# Patient Record
Sex: MALE | Race: WHITE | Employment: OTHER | URBAN - METROPOLITAN AREA
[De-identification: names, ages, dates, MRNs, and addresses within clinical notes are randomized per-mention and may not be internally consistent; named-entity substitution may affect disease eponyms.]

---

## 2017-05-03 PROBLEM — F41.9 ANXIETY: Status: ACTIVE | Noted: 2017-05-03

## 2017-11-21 PROBLEM — R60.0 LOCALIZED EDEMA: Status: ACTIVE | Noted: 2017-01-01

## 2018-01-01 ENCOUNTER — APPOINTMENT (OUTPATIENT)
Dept: GENERAL RADIOLOGY | Age: 68
DRG: 291 | End: 2018-01-01
Payer: MEDICARE

## 2018-01-01 ENCOUNTER — HOSPITAL ENCOUNTER (INPATIENT)
Age: 68
LOS: 3 days | Discharge: NURSING, REHAB OR LONG-TERM CARE | DRG: 291 | End: 2018-08-07
Attending: EMERGENCY MEDICINE | Admitting: FAMILY MEDICINE
Payer: MEDICARE

## 2018-01-01 ENCOUNTER — HOSPITAL ENCOUNTER (EMERGENCY)
Age: 68
Discharge: HOME | End: 2018-05-25
Attending: EMERGENCY MEDICINE
Payer: MEDICARE

## 2018-01-01 ENCOUNTER — APPOINTMENT (OUTPATIENT)
Dept: GENERAL RADIOLOGY | Age: 68
End: 2018-01-01
Payer: MEDICARE

## 2018-01-01 ENCOUNTER — HOSPITAL ENCOUNTER (INPATIENT)
Age: 68
LOS: 4 days | Discharge: NURSING, REHAB OR LONG-TERM CARE | DRG: 291 | End: 2018-07-27
Attending: EMERGENCY MEDICINE | Admitting: FAMILY MEDICINE
Payer: MEDICARE

## 2018-01-01 ENCOUNTER — APPOINTMENT (OUTPATIENT)
Dept: ULTRASOUND IMAGING | Age: 68
DRG: 291 | End: 2018-01-01
Payer: MEDICARE

## 2018-01-01 ENCOUNTER — APPOINTMENT (OUTPATIENT)
Dept: NUCLEAR MEDICINE | Age: 68
DRG: 291 | End: 2018-01-01
Payer: MEDICARE

## 2018-01-01 VITALS
SYSTOLIC BLOOD PRESSURE: 116 MMHG | OXYGEN SATURATION: 98 % | DIASTOLIC BLOOD PRESSURE: 53 MMHG | WEIGHT: 187.17 LBS | HEIGHT: 69 IN | TEMPERATURE: 97.5 F | HEART RATE: 64 BPM | BODY MASS INDEX: 27.72 KG/M2 | RESPIRATION RATE: 16 BRPM

## 2018-01-01 VITALS
DIASTOLIC BLOOD PRESSURE: 63 MMHG | TEMPERATURE: 97.4 F | RESPIRATION RATE: 18 BRPM | BODY MASS INDEX: 25.84 KG/M2 | OXYGEN SATURATION: 100 % | WEIGHT: 175 LBS | HEART RATE: 66 BPM | SYSTOLIC BLOOD PRESSURE: 121 MMHG

## 2018-01-01 VITALS
HEART RATE: 65 BPM | OXYGEN SATURATION: 100 % | WEIGHT: 174 LBS | HEIGHT: 69 IN | SYSTOLIC BLOOD PRESSURE: 140 MMHG | TEMPERATURE: 97.2 F | RESPIRATION RATE: 12 BRPM | DIASTOLIC BLOOD PRESSURE: 61 MMHG | BODY MASS INDEX: 25.77 KG/M2

## 2018-01-01 DIAGNOSIS — R09.89 PULMONARY VASCULAR CONGESTION: ICD-10-CM

## 2018-01-01 DIAGNOSIS — I50.9 ACUTE ON CHRONIC CONGESTIVE HEART FAILURE, UNSPECIFIED CONGESTIVE HEART FAILURE TYPE: Primary | ICD-10-CM

## 2018-01-01 DIAGNOSIS — J90 BILATERAL PLEURAL EFFUSION: ICD-10-CM

## 2018-01-01 DIAGNOSIS — R79.9 ELEVATED BUN: ICD-10-CM

## 2018-01-01 DIAGNOSIS — E87.5 HYPERKALEMIA: ICD-10-CM

## 2018-01-01 DIAGNOSIS — E87.0 HYPERNATREMIA: ICD-10-CM

## 2018-01-01 DIAGNOSIS — I50.9 ACUTE CONGESTIVE HEART FAILURE, UNSPECIFIED CONGESTIVE HEART FAILURE TYPE: Primary | ICD-10-CM

## 2018-01-01 DIAGNOSIS — R79.89 ELEVATED SERUM CREATININE: ICD-10-CM

## 2018-01-01 DIAGNOSIS — D64.9 CHRONIC ANEMIA: ICD-10-CM

## 2018-01-01 DIAGNOSIS — R07.9 CHEST PAIN, UNSPECIFIED TYPE: Primary | ICD-10-CM

## 2018-01-01 LAB
-: ABNORMAL
ABSOLUTE EOS #: 0 K/UL (ref 0–0.4)
ABSOLUTE EOS #: 0.04 K/UL (ref 0–0.44)
ABSOLUTE EOS #: 0.1 K/UL (ref 0–0.4)
ABSOLUTE EOS #: 0.2 K/UL (ref 0–0.4)
ABSOLUTE IMMATURE GRANULOCYTE: 0.03 K/UL (ref 0–0.3)
ABSOLUTE IMMATURE GRANULOCYTE: ABNORMAL K/UL (ref 0–0.3)
ABSOLUTE LYMPH #: 0.7 K/UL (ref 1–4.8)
ABSOLUTE LYMPH #: 0.8 K/UL (ref 1–4.8)
ABSOLUTE LYMPH #: 0.8 K/UL (ref 1–4.8)
ABSOLUTE LYMPH #: 0.82 K/UL (ref 1–4.8)
ABSOLUTE LYMPH #: 0.9 K/UL (ref 1–4.8)
ABSOLUTE LYMPH #: 1.1 K/UL (ref 1.1–3.7)
ABSOLUTE LYMPH #: 1.2 K/UL (ref 1–4.8)
ABSOLUTE LYMPH #: 1.2 K/UL (ref 1–4.8)
ABSOLUTE LYMPH #: 1.3 K/UL (ref 1–4.8)
ABSOLUTE LYMPH #: 1.5 K/UL (ref 1–4.8)
ABSOLUTE MONO #: 0.26 K/UL (ref 0.1–1.3)
ABSOLUTE MONO #: 0.6 K/UL (ref 0.1–1.3)
ABSOLUTE MONO #: 0.67 K/UL (ref 0.1–1.2)
ABSOLUTE MONO #: 0.7 K/UL (ref 0.1–1.3)
ABSOLUTE MONO #: 0.7 K/UL (ref 0.1–1.3)
ABSOLUTE MONO #: 0.9 K/UL (ref 0.1–1.3)
ABSOLUTE MONO #: 0.9 K/UL (ref 0.1–1.3)
ABSOLUTE MONO #: 1.1 K/UL (ref 0.1–1.3)
ALBUMIN SERPL-MCNC: 2.7 G/DL (ref 3.5–5.2)
ALBUMIN SERPL-MCNC: 3.1 G/DL (ref 3.5–5.2)
ALBUMIN SERPL-MCNC: 3.2 G/DL (ref 3.5–5.2)
ALBUMIN SERPL-MCNC: 3.3 G/DL (ref 3.5–5.2)
ALBUMIN SERPL-MCNC: 3.3 G/DL (ref 3.5–5.2)
ALBUMIN/GLOBULIN RATIO: ABNORMAL (ref 1–2.5)
ALP BLD-CCNC: 120 U/L (ref 40–129)
ALP BLD-CCNC: 126 U/L (ref 40–129)
ALP BLD-CCNC: 126 U/L (ref 40–129)
ALP BLD-CCNC: 129 U/L (ref 40–129)
ALP BLD-CCNC: 134 U/L (ref 40–129)
ALT SERPL-CCNC: 101 U/L (ref 5–41)
ALT SERPL-CCNC: 106 U/L (ref 5–41)
ALT SERPL-CCNC: 49 U/L (ref 5–41)
ALT SERPL-CCNC: 78 U/L (ref 5–41)
ALT SERPL-CCNC: 91 U/L (ref 5–41)
ALT SERPL-CCNC: 95 U/L (ref 5–41)
AMORPHOUS: ABNORMAL
ANION GAP SERPL CALCULATED.3IONS-SCNC: 11 MMOL/L (ref 9–17)
ANION GAP SERPL CALCULATED.3IONS-SCNC: 12 MMOL/L (ref 9–17)
ANION GAP SERPL CALCULATED.3IONS-SCNC: 13 MMOL/L (ref 9–17)
ANION GAP SERPL CALCULATED.3IONS-SCNC: 14 MMOL/L (ref 9–17)
ANION GAP SERPL CALCULATED.3IONS-SCNC: 14 MMOL/L (ref 9–17)
ANION GAP SERPL CALCULATED.3IONS-SCNC: 9 MMOL/L (ref 9–17)
ANTI-NUCLEAR ANTIBODY (ANA): NEGATIVE
AST SERPL-CCNC: 105 U/L
AST SERPL-CCNC: 39 U/L
AST SERPL-CCNC: 61 U/L
AST SERPL-CCNC: 81 U/L
AST SERPL-CCNC: 84 U/L
AST SERPL-CCNC: 97 U/L
BACTERIA: ABNORMAL
BASOPHILS # BLD: 0 % (ref 0–2)
BASOPHILS # BLD: 1 % (ref 0–2)
BASOPHILS ABSOLUTE: 0 K/UL (ref 0–0.2)
BASOPHILS ABSOLUTE: <0.03 K/UL (ref 0–0.2)
BILIRUB SERPL-MCNC: 0.26 MG/DL (ref 0.3–1.2)
BILIRUB SERPL-MCNC: 0.27 MG/DL (ref 0.3–1.2)
BILIRUB SERPL-MCNC: 0.29 MG/DL (ref 0.3–1.2)
BILIRUB SERPL-MCNC: 0.35 MG/DL (ref 0.3–1.2)
BILIRUB SERPL-MCNC: 0.39 MG/DL (ref 0.3–1.2)
BILIRUBIN DIRECT: 0.1 MG/DL
BILIRUBIN DIRECT: 0.11 MG/DL
BILIRUBIN DIRECT: 0.12 MG/DL
BILIRUBIN URINE: NEGATIVE
BILIRUBIN, INDIRECT: 0.16 MG/DL (ref 0–1)
BILIRUBIN, INDIRECT: 0.17 MG/DL (ref 0–1)
BILIRUBIN, INDIRECT: 0.28 MG/DL (ref 0–1)
BNP INTERPRETATION: ABNORMAL
BNP INTERPRETATION: ABNORMAL
BUN BLDV-MCNC: 38 MG/DL (ref 8–23)
BUN BLDV-MCNC: 54 MG/DL (ref 8–23)
BUN BLDV-MCNC: 55 MG/DL (ref 8–23)
BUN BLDV-MCNC: 59 MG/DL (ref 8–23)
BUN BLDV-MCNC: 59 MG/DL (ref 8–23)
BUN BLDV-MCNC: 61 MG/DL (ref 8–23)
BUN BLDV-MCNC: 61 MG/DL (ref 8–23)
BUN BLDV-MCNC: 62 MG/DL (ref 8–23)
BUN BLDV-MCNC: 63 MG/DL (ref 8–23)
BUN BLDV-MCNC: 63 MG/DL (ref 8–23)
BUN/CREAT BLD: ABNORMAL (ref 9–20)
CALCIUM SERPL-MCNC: 8.2 MG/DL (ref 8.6–10.4)
CALCIUM SERPL-MCNC: 8.3 MG/DL (ref 8.6–10.4)
CALCIUM SERPL-MCNC: 8.5 MG/DL (ref 8.6–10.4)
CALCIUM SERPL-MCNC: 8.6 MG/DL (ref 8.6–10.4)
CALCIUM SERPL-MCNC: 8.7 MG/DL (ref 8.6–10.4)
CALCIUM SERPL-MCNC: 8.8 MG/DL (ref 8.6–10.4)
CALCIUM SERPL-MCNC: 8.9 MG/DL (ref 8.6–10.4)
CALCIUM SERPL-MCNC: 9 MG/DL (ref 8.6–10.4)
CALCIUM SERPL-MCNC: 9 MG/DL (ref 8.6–10.4)
CALCIUM SERPL-MCNC: 9.1 MG/DL (ref 8.6–10.4)
CASTS UA: ABNORMAL /LPF
CERULOPLASMIN: 23 MG/DL (ref 15–30)
CHLORIDE BLD-SCNC: 101 MMOL/L (ref 98–107)
CHLORIDE BLD-SCNC: 102 MMOL/L (ref 98–107)
CHLORIDE BLD-SCNC: 104 MMOL/L (ref 98–107)
CHLORIDE BLD-SCNC: 104 MMOL/L (ref 98–107)
CHLORIDE BLD-SCNC: 107 MMOL/L (ref 98–107)
CHLORIDE BLD-SCNC: 108 MMOL/L (ref 98–107)
CHLORIDE BLD-SCNC: 108 MMOL/L (ref 98–107)
CHLORIDE BLD-SCNC: 115 MMOL/L (ref 98–107)
CHLORIDE BLD-SCNC: 117 MMOL/L (ref 98–107)
CHLORIDE BLD-SCNC: 99 MMOL/L (ref 98–107)
CO2: 20 MMOL/L (ref 20–31)
CO2: 21 MMOL/L (ref 20–31)
CO2: 22 MMOL/L (ref 20–31)
CO2: 24 MMOL/L (ref 20–31)
CO2: 24 MMOL/L (ref 20–31)
CO2: 25 MMOL/L (ref 20–31)
CO2: 26 MMOL/L (ref 20–31)
CO2: 27 MMOL/L (ref 20–31)
CO2: 27 MMOL/L (ref 20–31)
CO2: 29 MMOL/L (ref 20–31)
COLOR: YELLOW
COMMENT UA: ABNORMAL
CORTISOL COLLECTION INFO: NORMAL
CORTISOL: 17 UG/DL (ref 2.7–18.4)
CREAT SERPL-MCNC: 1.47 MG/DL (ref 0.7–1.2)
CREAT SERPL-MCNC: 1.65 MG/DL (ref 0.7–1.2)
CREAT SERPL-MCNC: 1.67 MG/DL (ref 0.7–1.2)
CREAT SERPL-MCNC: 1.75 MG/DL (ref 0.7–1.2)
CREAT SERPL-MCNC: 1.8 MG/DL (ref 0.7–1.2)
CREAT SERPL-MCNC: 1.92 MG/DL (ref 0.7–1.2)
CREAT SERPL-MCNC: 1.99 MG/DL (ref 0.7–1.2)
CREAT SERPL-MCNC: 2.03 MG/DL (ref 0.7–1.2)
CREAT SERPL-MCNC: 2.05 MG/DL (ref 0.7–1.2)
CREAT SERPL-MCNC: 2.15 MG/DL (ref 0.7–1.2)
CRYSTALS, UA: ABNORMAL /HPF
CULTURE: NO GROWTH
CULTURE: NORMAL
DIFFERENTIAL TYPE: ABNORMAL
EKG ATRIAL RATE: 61 BPM
EKG ATRIAL RATE: 63 BPM
EKG ATRIAL RATE: 64 BPM
EKG ATRIAL RATE: 64 BPM
EKG P AXIS: 28 DEGREES
EKG P AXIS: 37 DEGREES
EKG P AXIS: 53 DEGREES
EKG P AXIS: 88 DEGREES
EKG P-R INTERVAL: 226 MS
EKG P-R INTERVAL: 256 MS
EKG P-R INTERVAL: 272 MS
EKG P-R INTERVAL: 308 MS
EKG Q-T INTERVAL: 442 MS
EKG Q-T INTERVAL: 454 MS
EKG Q-T INTERVAL: 458 MS
EKG Q-T INTERVAL: 460 MS
EKG QRS DURATION: 102 MS
EKG QRS DURATION: 102 MS
EKG QRS DURATION: 116 MS
EKG QRS DURATION: 98 MS
EKG QTC CALCULATION (BAZETT): 455 MS
EKG QTC CALCULATION (BAZETT): 457 MS
EKG QTC CALCULATION (BAZETT): 468 MS
EKG QTC CALCULATION (BAZETT): 474 MS
EKG R AXIS: 37 DEGREES
EKG R AXIS: 39 DEGREES
EKG R AXIS: 46 DEGREES
EKG R AXIS: 75 DEGREES
EKG T AXIS: -29 DEGREES
EKG T AXIS: 165 DEGREES
EKG T AXIS: 39 DEGREES
EKG T AXIS: 71 DEGREES
EKG VENTRICULAR RATE: 61 BPM
EKG VENTRICULAR RATE: 63 BPM
EKG VENTRICULAR RATE: 64 BPM
EKG VENTRICULAR RATE: 64 BPM
EOSINOPHILS RELATIVE PERCENT: 0 % (ref 0–4)
EOSINOPHILS RELATIVE PERCENT: 1 % (ref 0–4)
EOSINOPHILS RELATIVE PERCENT: 1 % (ref 1–4)
EOSINOPHILS RELATIVE PERCENT: 2 % (ref 0–4)
EOSINOPHILS RELATIVE PERCENT: 2 % (ref 0–4)
EOSINOPHILS RELATIVE PERCENT: 3 % (ref 0–4)
EPITHELIAL CELLS UA: ABNORMAL /HPF
ESTIMATED AVERAGE GLUCOSE: 134 MG/DL
FERRITIN: 249 UG/L (ref 30–400)
FOLATE: 8.5 NG/ML
GFR AFRICAN AMERICAN: 37 ML/MIN
GFR AFRICAN AMERICAN: 39 ML/MIN
GFR AFRICAN AMERICAN: 40 ML/MIN
GFR AFRICAN AMERICAN: 41 ML/MIN
GFR AFRICAN AMERICAN: 42 ML/MIN
GFR AFRICAN AMERICAN: 46 ML/MIN
GFR AFRICAN AMERICAN: 47 ML/MIN
GFR AFRICAN AMERICAN: 50 ML/MIN
GFR AFRICAN AMERICAN: 51 ML/MIN
GFR AFRICAN AMERICAN: 58 ML/MIN
GFR NON-AFRICAN AMERICAN: 31 ML/MIN
GFR NON-AFRICAN AMERICAN: 32 ML/MIN
GFR NON-AFRICAN AMERICAN: 33 ML/MIN
GFR NON-AFRICAN AMERICAN: 34 ML/MIN
GFR NON-AFRICAN AMERICAN: 35 ML/MIN
GFR NON-AFRICAN AMERICAN: 38 ML/MIN
GFR NON-AFRICAN AMERICAN: 39 ML/MIN
GFR NON-AFRICAN AMERICAN: 41 ML/MIN
GFR NON-AFRICAN AMERICAN: 42 ML/MIN
GFR NON-AFRICAN AMERICAN: 48 ML/MIN
GFR SERPL CREATININE-BSD FRML MDRD: ABNORMAL ML/MIN/{1.73_M2}
GLOBULIN: ABNORMAL G/DL (ref 1.5–3.8)
GLUCOSE BLD-MCNC: 103 MG/DL (ref 75–110)
GLUCOSE BLD-MCNC: 108 MG/DL (ref 75–110)
GLUCOSE BLD-MCNC: 113 MG/DL (ref 75–110)
GLUCOSE BLD-MCNC: 118 MG/DL (ref 75–110)
GLUCOSE BLD-MCNC: 120 MG/DL (ref 75–110)
GLUCOSE BLD-MCNC: 122 MG/DL (ref 70–99)
GLUCOSE BLD-MCNC: 125 MG/DL (ref 75–110)
GLUCOSE BLD-MCNC: 131 MG/DL (ref 70–99)
GLUCOSE BLD-MCNC: 131 MG/DL (ref 75–110)
GLUCOSE BLD-MCNC: 134 MG/DL (ref 75–110)
GLUCOSE BLD-MCNC: 135 MG/DL (ref 75–110)
GLUCOSE BLD-MCNC: 136 MG/DL (ref 75–110)
GLUCOSE BLD-MCNC: 137 MG/DL (ref 70–99)
GLUCOSE BLD-MCNC: 137 MG/DL (ref 70–99)
GLUCOSE BLD-MCNC: 139 MG/DL (ref 75–110)
GLUCOSE BLD-MCNC: 143 MG/DL (ref 70–99)
GLUCOSE BLD-MCNC: 148 MG/DL (ref 70–99)
GLUCOSE BLD-MCNC: 161 MG/DL (ref 75–110)
GLUCOSE BLD-MCNC: 167 MG/DL (ref 70–99)
GLUCOSE BLD-MCNC: 178 MG/DL (ref 75–110)
GLUCOSE BLD-MCNC: 204 MG/DL (ref 70–99)
GLUCOSE BLD-MCNC: 212 MG/DL (ref 75–110)
GLUCOSE BLD-MCNC: 32 MG/DL (ref 75–110)
GLUCOSE BLD-MCNC: 45 MG/DL (ref 75–110)
GLUCOSE BLD-MCNC: 46 MG/DL (ref 75–110)
GLUCOSE BLD-MCNC: 51 MG/DL (ref 70–99)
GLUCOSE BLD-MCNC: 52 MG/DL (ref 75–110)
GLUCOSE BLD-MCNC: 62 MG/DL (ref 75–110)
GLUCOSE BLD-MCNC: 76 MG/DL (ref 75–110)
GLUCOSE BLD-MCNC: 78 MG/DL (ref 75–110)
GLUCOSE BLD-MCNC: 78 MG/DL (ref 75–110)
GLUCOSE BLD-MCNC: 79 MG/DL (ref 75–110)
GLUCOSE BLD-MCNC: 87 MG/DL (ref 75–110)
GLUCOSE BLD-MCNC: 88 MG/DL (ref 75–110)
GLUCOSE BLD-MCNC: 89 MG/DL (ref 70–99)
GLUCOSE BLD-MCNC: 90 MG/DL (ref 75–110)
GLUCOSE BLD-MCNC: 96 MG/DL (ref 75–110)
GLUCOSE URINE: NEGATIVE
HAV IGM SER IA-ACNC: NONREACTIVE
HAV IGM SER IA-ACNC: NONREACTIVE
HBA1C MFR BLD: 6.3 % (ref 4–6)
HCT VFR BLD CALC: 29.9 % (ref 41–53)
HCT VFR BLD CALC: 30.2 % (ref 41–53)
HCT VFR BLD CALC: 30.3 % (ref 41–53)
HCT VFR BLD CALC: 30.3 % (ref 41–53)
HCT VFR BLD CALC: 30.6 % (ref 41–53)
HCT VFR BLD CALC: 30.7 % (ref 41–53)
HCT VFR BLD CALC: 30.8 % (ref 41–53)
HCT VFR BLD CALC: 31 % (ref 41–53)
HCT VFR BLD CALC: 31.5 % (ref 41–53)
HCT VFR BLD CALC: 32.2 % (ref 40.7–50.3)
HCT VFR BLD CALC: 32.9 % (ref 41–53)
HEMOGLOBIN: 10 G/DL (ref 13.5–17.5)
HEMOGLOBIN: 10.1 G/DL (ref 13.5–17.5)
HEMOGLOBIN: 10.3 G/DL (ref 13.5–17.5)
HEMOGLOBIN: 10.3 G/DL (ref 13.5–17.5)
HEMOGLOBIN: 10.4 G/DL (ref 13–17)
HEMOGLOBIN: 10.5 G/DL (ref 13.5–17.5)
HEMOGLOBIN: 9.7 G/DL (ref 13.5–17.5)
HEMOGLOBIN: 9.8 G/DL (ref 13.5–17.5)
HEMOGLOBIN: 9.9 G/DL (ref 13.5–17.5)
HEPATITIS B CORE IGM ANTIBODY: NONREACTIVE
HEPATITIS B CORE IGM ANTIBODY: NONREACTIVE
HEPATITIS B SURFACE ANTIGEN: NONREACTIVE
HEPATITIS B SURFACE ANTIGEN: NONREACTIVE
HEPATITIS C ANTIBODY: NONREACTIVE
HEPATITIS C ANTIBODY: NONREACTIVE
IMMATURE GRANULOCYTES: 0 %
IMMATURE GRANULOCYTES: ABNORMAL %
INR BLD: 1.1
IRON SATURATION: 18 % (ref 20–55)
IRON: 48 UG/DL (ref 59–158)
KETONES, URINE: NEGATIVE
LEUKOCYTE ESTERASE, URINE: NEGATIVE
LV EF: 58 %
LVEF MODALITY: NORMAL
LYMPHOCYTES # BLD: 10 % (ref 24–44)
LYMPHOCYTES # BLD: 12 % (ref 24–44)
LYMPHOCYTES # BLD: 12 % (ref 24–44)
LYMPHOCYTES # BLD: 13 % (ref 24–44)
LYMPHOCYTES # BLD: 14 % (ref 24–43)
LYMPHOCYTES # BLD: 15 % (ref 24–44)
LYMPHOCYTES # BLD: 16 % (ref 24–44)
LYMPHOCYTES # BLD: 17 % (ref 24–44)
LYMPHOCYTES # BLD: 18 % (ref 24–44)
LYMPHOCYTES # BLD: 19 % (ref 24–44)
Lab: NORMAL
MAGNESIUM: 1.7 MG/DL (ref 1.6–2.6)
MAGNESIUM: 1.9 MG/DL (ref 1.6–2.6)
MAGNESIUM: 1.9 MG/DL (ref 1.6–2.6)
MCH RBC QN AUTO: 31.7 PG (ref 25.2–33.5)
MCH RBC QN AUTO: 31.9 PG (ref 26–34)
MCH RBC QN AUTO: 32 PG (ref 26–34)
MCH RBC QN AUTO: 32.2 PG (ref 26–34)
MCH RBC QN AUTO: 32.7 PG (ref 26–34)
MCH RBC QN AUTO: 32.7 PG (ref 26–34)
MCH RBC QN AUTO: 32.8 PG (ref 26–34)
MCH RBC QN AUTO: 33.2 PG (ref 26–34)
MCHC RBC AUTO-ENTMCNC: 31.8 G/DL (ref 31–37)
MCHC RBC AUTO-ENTMCNC: 32 G/DL (ref 31–37)
MCHC RBC AUTO-ENTMCNC: 32.3 G/DL (ref 28.4–34.8)
MCHC RBC AUTO-ENTMCNC: 32.7 G/DL (ref 31–37)
MCHC RBC AUTO-ENTMCNC: 32.8 G/DL (ref 31–37)
MCHC RBC AUTO-ENTMCNC: 33.1 G/DL (ref 31–37)
MCV RBC AUTO: 100.3 FL (ref 80–100)
MCV RBC AUTO: 100.8 FL (ref 80–100)
MCV RBC AUTO: 101.4 FL (ref 80–100)
MCV RBC AUTO: 97.8 FL (ref 80–100)
MCV RBC AUTO: 98 FL (ref 80–100)
MCV RBC AUTO: 98.2 FL (ref 82.6–102.9)
MCV RBC AUTO: 98.3 FL (ref 80–100)
MCV RBC AUTO: 98.6 FL (ref 80–100)
MCV RBC AUTO: 98.9 FL (ref 80–100)
MCV RBC AUTO: 99.1 FL (ref 80–100)
MITOCHONDRIAL ANTIBODY: 2 UNITS (ref 0–20)
MONOCYTES # BLD: 10 % (ref 1–7)
MONOCYTES # BLD: 10 % (ref 1–7)
MONOCYTES # BLD: 11 % (ref 1–7)
MONOCYTES # BLD: 13 % (ref 1–7)
MONOCYTES # BLD: 14 % (ref 1–7)
MONOCYTES # BLD: 5 % (ref 1–7)
MONOCYTES # BLD: 8 % (ref 1–7)
MONOCYTES # BLD: 9 % (ref 1–7)
MONOCYTES # BLD: 9 % (ref 1–7)
MONOCYTES # BLD: 9 % (ref 3–12)
MORPHOLOGY: ABNORMAL
MUCUS: ABNORMAL
NITRITE, URINE: POSITIVE
NRBC AUTOMATED: 0 PER 100 WBC
NRBC AUTOMATED: ABNORMAL PER 100 WBC
OTHER OBSERVATIONS UA: ABNORMAL
PARTIAL THROMBOPLASTIN TIME: 40.4 SEC (ref 23–31)
PARTIAL THROMBOPLASTIN TIME: 51.8 SEC (ref 23–31)
PARTIAL THROMBOPLASTIN TIME: >120 SEC (ref 23–31)
PARTIAL THROMBOPLASTIN TIME: >120 SEC (ref 23–31)
PDW BLD-RTO: 13.9 % (ref 11.8–14.4)
PDW BLD-RTO: 15.7 % (ref 11.5–14.9)
PDW BLD-RTO: 15.8 % (ref 11.5–14.9)
PDW BLD-RTO: 15.8 % (ref 11.5–14.9)
PDW BLD-RTO: 15.9 % (ref 11.5–14.9)
PDW BLD-RTO: 16.4 % (ref 11.5–14.9)
PDW BLD-RTO: 16.4 % (ref 11.5–14.9)
PDW BLD-RTO: 16.5 % (ref 11.5–14.9)
PDW BLD-RTO: 16.8 % (ref 11.5–14.9)
PDW BLD-RTO: 17.2 % (ref 11.5–14.9)
PH UA: 6 (ref 5–8)
PHOSPHORUS: 2.5 MG/DL (ref 2.5–4.5)
PHOSPHORUS: 4.2 MG/DL (ref 2.5–4.5)
PLATELET # BLD: 168 K/UL (ref 150–450)
PLATELET # BLD: 172 K/UL (ref 150–450)
PLATELET # BLD: 184 K/UL (ref 150–450)
PLATELET # BLD: 185 K/UL (ref 150–450)
PLATELET # BLD: 67 K/UL (ref 150–450)
PLATELET # BLD: 70 K/UL (ref 150–450)
PLATELET # BLD: 79 K/UL (ref 150–450)
PLATELET # BLD: 81 K/UL (ref 150–450)
PLATELET # BLD: 83 K/UL (ref 150–450)
PLATELET # BLD: ABNORMAL K/UL (ref 138–453)
PLATELET ESTIMATE: ABNORMAL
PLATELET, FLUORESCENCE: 134 K/UL (ref 138–453)
PLATELET, IMMATURE FRACTION: 4.3 % (ref 1.1–10.3)
PMV BLD AUTO: 10 FL (ref 6–12)
PMV BLD AUTO: 10.7 FL (ref 6–12)
PMV BLD AUTO: 11.5 FL (ref 6–12)
PMV BLD AUTO: 11.9 FL (ref 6–12)
PMV BLD AUTO: 12.1 FL (ref 6–12)
PMV BLD AUTO: 12.1 FL (ref 6–12)
PMV BLD AUTO: 9.3 FL (ref 6–12)
PMV BLD AUTO: 9.5 FL (ref 6–12)
PMV BLD AUTO: 9.7 FL (ref 6–12)
PMV BLD AUTO: ABNORMAL FL (ref 8.1–13.5)
POC TROPONIN I: 0.01 NG/ML (ref 0–0.1)
POC TROPONIN I: 0.01 NG/ML (ref 0–0.1)
POC TROPONIN INTERP: NORMAL
POC TROPONIN INTERP: NORMAL
POTASSIUM SERPL-SCNC: 3.9 MMOL/L (ref 3.7–5.3)
POTASSIUM SERPL-SCNC: 4.1 MMOL/L (ref 3.7–5.3)
POTASSIUM SERPL-SCNC: 4.2 MMOL/L (ref 3.7–5.3)
POTASSIUM SERPL-SCNC: 4.3 MMOL/L (ref 3.7–5.3)
POTASSIUM SERPL-SCNC: 4.3 MMOL/L (ref 3.7–5.3)
POTASSIUM SERPL-SCNC: 4.4 MMOL/L (ref 3.7–5.3)
POTASSIUM SERPL-SCNC: 4.5 MMOL/L (ref 3.7–5.3)
POTASSIUM SERPL-SCNC: 4.8 MMOL/L (ref 3.7–5.3)
POTASSIUM SERPL-SCNC: 4.9 MMOL/L (ref 3.7–5.3)
POTASSIUM SERPL-SCNC: 5.2 MMOL/L (ref 3.7–5.3)
POTASSIUM SERPL-SCNC: 5.4 MMOL/L (ref 3.7–5.3)
PRO-BNP: 2593 PG/ML
PRO-BNP: 4033 PG/ML
PROTEIN UA: ABNORMAL
PROTHROMBIN TIME: 11.4 SEC (ref 9.7–12)
RBC # BLD: 2.95 M/UL (ref 4.5–5.9)
RBC # BLD: 3.05 M/UL (ref 4.5–5.9)
RBC # BLD: 3.06 M/UL (ref 4.5–5.9)
RBC # BLD: 3.09 M/UL (ref 4.5–5.9)
RBC # BLD: 3.1 M/UL (ref 4.5–5.9)
RBC # BLD: 3.14 M/UL (ref 4.5–5.9)
RBC # BLD: 3.14 M/UL (ref 4.5–5.9)
RBC # BLD: 3.2 M/UL (ref 4.5–5.9)
RBC # BLD: 3.27 M/UL (ref 4.5–5.9)
RBC # BLD: 3.28 M/UL (ref 4.21–5.77)
RBC # BLD: ABNORMAL 10*6/UL
RBC UA: ABNORMAL /HPF
RENAL EPITHELIAL, UA: ABNORMAL /HPF
SEG NEUTROPHILS: 67 % (ref 36–66)
SEG NEUTROPHILS: 69 % (ref 36–66)
SEG NEUTROPHILS: 72 % (ref 36–66)
SEG NEUTROPHILS: 73 % (ref 36–66)
SEG NEUTROPHILS: 73 % (ref 36–66)
SEG NEUTROPHILS: 75 % (ref 36–66)
SEG NEUTROPHILS: 75 % (ref 36–66)
SEG NEUTROPHILS: 76 % (ref 36–65)
SEG NEUTROPHILS: 79 % (ref 36–66)
SEG NEUTROPHILS: 79 % (ref 36–66)
SEGMENTED NEUTROPHILS ABSOLUTE COUNT: 4.02 K/UL (ref 1.3–9.1)
SEGMENTED NEUTROPHILS ABSOLUTE COUNT: 4.6 K/UL (ref 1.3–9.1)
SEGMENTED NEUTROPHILS ABSOLUTE COUNT: 4.6 K/UL (ref 1.3–9.1)
SEGMENTED NEUTROPHILS ABSOLUTE COUNT: 4.9 K/UL (ref 1.3–9.1)
SEGMENTED NEUTROPHILS ABSOLUTE COUNT: 5 K/UL (ref 1.3–9.1)
SEGMENTED NEUTROPHILS ABSOLUTE COUNT: 5.4 K/UL (ref 1.3–9.1)
SEGMENTED NEUTROPHILS ABSOLUTE COUNT: 5.5 K/UL (ref 1.3–9.1)
SEGMENTED NEUTROPHILS ABSOLUTE COUNT: 5.84 K/UL (ref 1.5–8.1)
SEGMENTED NEUTROPHILS ABSOLUTE COUNT: 6.2 K/UL (ref 1.3–9.1)
SEGMENTED NEUTROPHILS ABSOLUTE COUNT: 6.6 K/UL (ref 1.3–9.1)
SODIUM BLD-SCNC: 132 MMOL/L (ref 135–144)
SODIUM BLD-SCNC: 137 MMOL/L (ref 135–144)
SODIUM BLD-SCNC: 139 MMOL/L (ref 135–144)
SODIUM BLD-SCNC: 141 MMOL/L (ref 135–144)
SODIUM BLD-SCNC: 142 MMOL/L (ref 135–144)
SODIUM BLD-SCNC: 144 MMOL/L (ref 135–144)
SODIUM BLD-SCNC: 147 MMOL/L (ref 135–144)
SODIUM BLD-SCNC: 148 MMOL/L (ref 135–144)
SODIUM BLD-SCNC: 149 MMOL/L (ref 135–144)
SODIUM BLD-SCNC: 150 MMOL/L (ref 135–144)
SPECIFIC GRAVITY UA: 1.01 (ref 1–1.03)
SPECIMEN DESCRIPTION: NORMAL
STATUS: NORMAL
TOTAL IRON BINDING CAPACITY: 269 UG/DL (ref 250–450)
TOTAL PROTEIN: 6.1 G/DL (ref 6.4–8.3)
TOTAL PROTEIN: 6.2 G/DL (ref 6.4–8.3)
TOTAL PROTEIN: 6.3 G/DL (ref 6.4–8.3)
TOTAL PROTEIN: 6.4 G/DL (ref 6.4–8.3)
TOTAL PROTEIN: 6.7 G/DL (ref 6.4–8.3)
TRICHOMONAS: ABNORMAL
TROPONIN INTERP: NORMAL
TROPONIN T: <0.03 NG/ML
TURBIDITY: CLEAR
UNSATURATED IRON BINDING CAPACITY: 221 UG/DL (ref 112–347)
URINE HGB: NEGATIVE
UROBILINOGEN, URINE: NORMAL
VITAMIN B-12: 1926 PG/ML (ref 232–1245)
WBC # BLD: 5.1 K/UL (ref 3.5–11)
WBC # BLD: 6.1 K/UL (ref 3.5–11)
WBC # BLD: 6.3 K/UL (ref 3.5–11)
WBC # BLD: 6.9 K/UL (ref 3.5–11)
WBC # BLD: 7.4 K/UL (ref 3.5–11)
WBC # BLD: 7.4 K/UL (ref 3.5–11)
WBC # BLD: 7.7 K/UL (ref 3.5–11.3)
WBC # BLD: 7.8 K/UL (ref 3.5–11)
WBC # BLD: 8.2 K/UL (ref 3.5–11)
WBC # BLD: 8.3 K/UL (ref 3.5–11)
WBC # BLD: ABNORMAL 10*3/UL
WBC UA: ABNORMAL /HPF
YEAST: ABNORMAL

## 2018-01-01 PROCEDURE — 87086 URINE CULTURE/COLONY COUNT: CPT

## 2018-01-01 PROCEDURE — 84484 ASSAY OF TROPONIN QUANT: CPT

## 2018-01-01 PROCEDURE — 83735 ASSAY OF MAGNESIUM: CPT

## 2018-01-01 PROCEDURE — 6370000000 HC RX 637 (ALT 250 FOR IP): Performed by: INTERNAL MEDICINE

## 2018-01-01 PROCEDURE — 2580000003 HC RX 258: Performed by: INTERNAL MEDICINE

## 2018-01-01 PROCEDURE — A9540 TC99M MAA: HCPCS | Performed by: EMERGENCY MEDICINE

## 2018-01-01 PROCEDURE — 97110 THERAPEUTIC EXERCISES: CPT

## 2018-01-01 PROCEDURE — 85025 COMPLETE CBC W/AUTO DIFF WBC: CPT

## 2018-01-01 PROCEDURE — 82728 ASSAY OF FERRITIN: CPT

## 2018-01-01 PROCEDURE — 97535 SELF CARE MNGMENT TRAINING: CPT

## 2018-01-01 PROCEDURE — 6360000002 HC RX W HCPCS: Performed by: FAMILY MEDICINE

## 2018-01-01 PROCEDURE — 81001 URINALYSIS AUTO W/SCOPE: CPT

## 2018-01-01 PROCEDURE — 99285 EMERGENCY DEPT VISIT HI MDM: CPT

## 2018-01-01 PROCEDURE — 99232 SBSQ HOSP IP/OBS MODERATE 35: CPT | Performed by: INTERNAL MEDICINE

## 2018-01-01 PROCEDURE — 2060000000 HC ICU INTERMEDIATE R&B

## 2018-01-01 PROCEDURE — 71045 X-RAY EXAM CHEST 1 VIEW: CPT

## 2018-01-01 PROCEDURE — 2500000003 HC RX 250 WO HCPCS: Performed by: FAMILY MEDICINE

## 2018-01-01 PROCEDURE — 6370000000 HC RX 637 (ALT 250 FOR IP): Performed by: FAMILY MEDICINE

## 2018-01-01 PROCEDURE — 85730 THROMBOPLASTIN TIME PARTIAL: CPT

## 2018-01-01 PROCEDURE — 83880 ASSAY OF NATRIURETIC PEPTIDE: CPT

## 2018-01-01 PROCEDURE — 76705 ECHO EXAM OF ABDOMEN: CPT

## 2018-01-01 PROCEDURE — 93005 ELECTROCARDIOGRAM TRACING: CPT

## 2018-01-01 PROCEDURE — 84460 ALANINE AMINO (ALT) (SGPT): CPT

## 2018-01-01 PROCEDURE — 82390 ASSAY OF CERULOPLASMIN: CPT

## 2018-01-01 PROCEDURE — G8978 MOBILITY CURRENT STATUS: HCPCS

## 2018-01-01 PROCEDURE — 97116 GAIT TRAINING THERAPY: CPT

## 2018-01-01 PROCEDURE — 85018 HEMOGLOBIN: CPT

## 2018-01-01 PROCEDURE — 6360000002 HC RX W HCPCS: Performed by: INTERNAL MEDICINE

## 2018-01-01 PROCEDURE — 87040 BLOOD CULTURE FOR BACTERIA: CPT

## 2018-01-01 PROCEDURE — 36415 COLL VENOUS BLD VENIPUNCTURE: CPT

## 2018-01-01 PROCEDURE — 83036 HEMOGLOBIN GLYCOSYLATED A1C: CPT

## 2018-01-01 PROCEDURE — 82607 VITAMIN B-12: CPT

## 2018-01-01 PROCEDURE — 2580000003 HC RX 258: Performed by: FAMILY MEDICINE

## 2018-01-01 PROCEDURE — 99239 HOSP IP/OBS DSCHRG MGMT >30: CPT | Performed by: FAMILY MEDICINE

## 2018-01-01 PROCEDURE — 84450 TRANSFERASE (AST) (SGOT): CPT

## 2018-01-01 PROCEDURE — 97162 PT EVAL MOD COMPLEX 30 MIN: CPT

## 2018-01-01 PROCEDURE — 94664 DEMO&/EVAL PT USE INHALER: CPT

## 2018-01-01 PROCEDURE — 80074 ACUTE HEPATITIS PANEL: CPT

## 2018-01-01 PROCEDURE — 97166 OT EVAL MOD COMPLEX 45 MIN: CPT

## 2018-01-01 PROCEDURE — 6360000002 HC RX W HCPCS: Performed by: EMERGENCY MEDICINE

## 2018-01-01 PROCEDURE — 80076 HEPATIC FUNCTION PANEL: CPT

## 2018-01-01 PROCEDURE — 85055 RETICULATED PLATELET ASSAY: CPT

## 2018-01-01 PROCEDURE — 85014 HEMATOCRIT: CPT

## 2018-01-01 PROCEDURE — 83516 IMMUNOASSAY NONANTIBODY: CPT

## 2018-01-01 PROCEDURE — 84132 ASSAY OF SERUM POTASSIUM: CPT

## 2018-01-01 PROCEDURE — 99232 SBSQ HOSP IP/OBS MODERATE 35: CPT | Performed by: FAMILY MEDICINE

## 2018-01-01 PROCEDURE — 96374 THER/PROPH/DIAG INJ IV PUSH: CPT

## 2018-01-01 PROCEDURE — 80053 COMPREHEN METABOLIC PANEL: CPT

## 2018-01-01 PROCEDURE — 80048 BASIC METABOLIC PNL TOTAL CA: CPT

## 2018-01-01 PROCEDURE — 97530 THERAPEUTIC ACTIVITIES: CPT

## 2018-01-01 PROCEDURE — 82947 ASSAY GLUCOSE BLOOD QUANT: CPT

## 2018-01-01 PROCEDURE — 97165 OT EVAL LOW COMPLEX 30 MIN: CPT

## 2018-01-01 PROCEDURE — 51798 US URINE CAPACITY MEASURE: CPT

## 2018-01-01 PROCEDURE — 83550 IRON BINDING TEST: CPT

## 2018-01-01 PROCEDURE — 78582 LUNG VENTILAT&PERFUS IMAGING: CPT

## 2018-01-01 PROCEDURE — 84295 ASSAY OF SERUM SODIUM: CPT

## 2018-01-01 PROCEDURE — 99222 1ST HOSP IP/OBS MODERATE 55: CPT | Performed by: FAMILY MEDICINE

## 2018-01-01 PROCEDURE — 85610 PROTHROMBIN TIME: CPT

## 2018-01-01 PROCEDURE — 86038 ANTINUCLEAR ANTIBODIES: CPT

## 2018-01-01 PROCEDURE — 82533 TOTAL CORTISOL: CPT

## 2018-01-01 PROCEDURE — 71046 X-RAY EXAM CHEST 2 VIEWS: CPT

## 2018-01-01 PROCEDURE — 99222 1ST HOSP IP/OBS MODERATE 55: CPT | Performed by: INTERNAL MEDICINE

## 2018-01-01 PROCEDURE — 3430000000 HC RX DIAGNOSTIC RADIOPHARMACEUTICAL: Performed by: EMERGENCY MEDICINE

## 2018-01-01 PROCEDURE — A9538 TC99M PYROPHOSPHATE: HCPCS | Performed by: EMERGENCY MEDICINE

## 2018-01-01 PROCEDURE — G8979 MOBILITY GOAL STATUS: HCPCS

## 2018-01-01 PROCEDURE — 76770 US EXAM ABDO BACK WALL COMP: CPT

## 2018-01-01 PROCEDURE — 99223 1ST HOSP IP/OBS HIGH 75: CPT | Performed by: FAMILY MEDICINE

## 2018-01-01 PROCEDURE — 6370000000 HC RX 637 (ALT 250 FOR IP): Performed by: NURSE PRACTITIONER

## 2018-01-01 PROCEDURE — 83540 ASSAY OF IRON: CPT

## 2018-01-01 PROCEDURE — 84100 ASSAY OF PHOSPHORUS: CPT

## 2018-01-01 PROCEDURE — 93306 TTE W/DOPPLER COMPLETE: CPT

## 2018-01-01 PROCEDURE — 51702 INSERT TEMP BLADDER CATH: CPT

## 2018-01-01 PROCEDURE — 82746 ASSAY OF FOLIC ACID SERUM: CPT

## 2018-01-01 PROCEDURE — 2580000003 HC RX 258: Performed by: EMERGENCY MEDICINE

## 2018-01-01 RX ORDER — SODIUM CHLORIDE 0.9 % (FLUSH) 0.9 %
10 SYRINGE (ML) INJECTION EVERY 12 HOURS SCHEDULED
Status: DISCONTINUED | OUTPATIENT
Start: 2018-01-01 | End: 2018-01-01 | Stop reason: HOSPADM

## 2018-01-01 RX ORDER — LOSARTAN POTASSIUM 25 MG/1
25 TABLET ORAL DAILY
Status: DISCONTINUED | OUTPATIENT
Start: 2018-01-01 | End: 2018-01-01

## 2018-01-01 RX ORDER — FUROSEMIDE 40 MG/1
40 TABLET ORAL DAILY
Status: DISCONTINUED | OUTPATIENT
Start: 2018-01-01 | End: 2018-01-01 | Stop reason: HOSPADM

## 2018-01-01 RX ORDER — CLOPIDOGREL BISULFATE 75 MG/1
75 TABLET ORAL DAILY
Status: DISCONTINUED | OUTPATIENT
Start: 2018-01-01 | End: 2018-01-01 | Stop reason: HOSPADM

## 2018-01-01 RX ORDER — FUROSEMIDE 10 MG/ML
40 INJECTION INTRAMUSCULAR; INTRAVENOUS 2 TIMES DAILY
Status: DISCONTINUED | OUTPATIENT
Start: 2018-01-01 | End: 2018-01-01

## 2018-01-01 RX ORDER — BACLOFEN 10 MG/1
10 TABLET ORAL 3 TIMES DAILY
Status: DISCONTINUED | OUTPATIENT
Start: 2018-01-01 | End: 2018-01-01 | Stop reason: HOSPADM

## 2018-01-01 RX ORDER — AZELASTINE HYDROCHLORIDE, FLUTICASONE PROPIONATE 137; 50 UG/1; UG/1
1 SPRAY, METERED NASAL 2 TIMES DAILY
Status: DISCONTINUED | OUTPATIENT
Start: 2018-01-01 | End: 2018-01-01 | Stop reason: RX

## 2018-01-01 RX ORDER — LATANOPROST 50 UG/ML
1 SOLUTION/ DROPS OPHTHALMIC NIGHTLY
Status: DISCONTINUED | OUTPATIENT
Start: 2018-01-01 | End: 2018-01-01

## 2018-01-01 RX ORDER — TAMSULOSIN HYDROCHLORIDE 0.4 MG/1
0.4 CAPSULE ORAL DAILY
Status: DISCONTINUED | OUTPATIENT
Start: 2018-01-01 | End: 2018-01-01 | Stop reason: HOSPADM

## 2018-01-01 RX ORDER — DEXTROSE MONOHYDRATE 50 MG/ML
100 INJECTION, SOLUTION INTRAVENOUS PRN
Status: DISCONTINUED | OUTPATIENT
Start: 2018-01-01 | End: 2018-01-01 | Stop reason: HOSPADM

## 2018-01-01 RX ORDER — DEXTROSE MONOHYDRATE 25 G/50ML
12.5 INJECTION, SOLUTION INTRAVENOUS PRN
Status: DISCONTINUED | OUTPATIENT
Start: 2018-01-01 | End: 2018-01-01 | Stop reason: HOSPADM

## 2018-01-01 RX ORDER — BISACODYL 10 MG
10 SUPPOSITORY, RECTAL RECTAL DAILY PRN
Status: DISCONTINUED | OUTPATIENT
Start: 2018-01-01 | End: 2018-01-01 | Stop reason: HOSPADM

## 2018-01-01 RX ORDER — ACETAMINOPHEN 325 MG/1
650 TABLET ORAL EVERY 4 HOURS PRN
Status: DISCONTINUED | OUTPATIENT
Start: 2018-01-01 | End: 2018-01-01 | Stop reason: HOSPADM

## 2018-01-01 RX ORDER — AZELASTINE HYDROCHLORIDE, FLUTICASONE PROPIONATE 137; 50 UG/1; UG/1
1 SPRAY, METERED NASAL 2 TIMES DAILY
Status: DISCONTINUED | OUTPATIENT
Start: 2018-01-01 | End: 2018-01-01 | Stop reason: HOSPADM

## 2018-01-01 RX ORDER — LATANOPROST 50 UG/ML
1 SOLUTION/ DROPS OPHTHALMIC NIGHTLY
Status: DISCONTINUED | OUTPATIENT
Start: 2018-01-01 | End: 2018-01-01 | Stop reason: HOSPADM

## 2018-01-01 RX ORDER — TAMSULOSIN HYDROCHLORIDE 0.4 MG/1
0.4 CAPSULE ORAL DAILY
Qty: 30 CAPSULE | Refills: 3 | Status: SHIPPED | OUTPATIENT
Start: 2018-01-01

## 2018-01-01 RX ORDER — FLUTICASONE PROPIONATE 50 MCG
2 SPRAY, SUSPENSION (ML) NASAL 2 TIMES DAILY
Status: DISCONTINUED | OUTPATIENT
Start: 2018-01-01 | End: 2018-01-01

## 2018-01-01 RX ORDER — DEXTROSE MONOHYDRATE 50 MG/ML
INJECTION, SOLUTION INTRAVENOUS CONTINUOUS
Status: DISCONTINUED | OUTPATIENT
Start: 2018-01-01 | End: 2018-01-01 | Stop reason: HOSPADM

## 2018-01-01 RX ORDER — FUROSEMIDE 10 MG/ML
40 INJECTION INTRAMUSCULAR; INTRAVENOUS ONCE
Status: COMPLETED | OUTPATIENT
Start: 2018-01-01 | End: 2018-01-01

## 2018-01-01 RX ORDER — TRAVOPROST OPHTHALMIC SOLUTION 0.04 MG/ML
1 SOLUTION OPHTHALMIC NIGHTLY
Status: DISCONTINUED | OUTPATIENT
Start: 2018-01-01 | End: 2018-01-01 | Stop reason: HOSPADM

## 2018-01-01 RX ORDER — ATENOLOL 50 MG/1
50 TABLET ORAL DAILY
Status: DISCONTINUED | OUTPATIENT
Start: 2018-01-01 | End: 2018-01-01 | Stop reason: HOSPADM

## 2018-01-01 RX ORDER — BACITRACIN, NEOMYCIN, POLYMYXIN B 400; 3.5; 5 [USP'U]/G; MG/G; [USP'U]/G
OINTMENT TOPICAL
Qty: 1 TUBE | Refills: 3
Start: 2018-01-01 | End: 2018-01-01

## 2018-01-01 RX ORDER — HEPARIN SODIUM 1000 [USP'U]/ML
2000 INJECTION, SOLUTION INTRAVENOUS; SUBCUTANEOUS PRN
Status: DISCONTINUED | OUTPATIENT
Start: 2018-01-01 | End: 2018-01-01

## 2018-01-01 RX ORDER — LANOLIN ALCOHOL/MO/W.PET/CERES
1000 CREAM (GRAM) TOPICAL DAILY
Status: DISCONTINUED | OUTPATIENT
Start: 2018-01-01 | End: 2018-01-01 | Stop reason: HOSPADM

## 2018-01-01 RX ORDER — ASPIRIN 81 MG/1
81 TABLET ORAL DAILY
Status: DISCONTINUED | OUTPATIENT
Start: 2018-01-01 | End: 2018-01-01 | Stop reason: HOSPADM

## 2018-01-01 RX ORDER — HEPARIN SODIUM 5000 [USP'U]/ML
5000 INJECTION, SOLUTION INTRAVENOUS; SUBCUTANEOUS EVERY 8 HOURS SCHEDULED
Status: DISCONTINUED | OUTPATIENT
Start: 2018-01-01 | End: 2018-01-01 | Stop reason: HOSPADM

## 2018-01-01 RX ORDER — FUROSEMIDE 40 MG/1
40 TABLET ORAL DAILY
Qty: 60 TABLET | Refills: 3 | Status: SHIPPED | OUTPATIENT
Start: 2018-01-01

## 2018-01-01 RX ORDER — HEPARIN SODIUM 1000 [USP'U]/ML
4000 INJECTION, SOLUTION INTRAVENOUS; SUBCUTANEOUS PRN
Status: DISCONTINUED | OUTPATIENT
Start: 2018-01-01 | End: 2018-01-01

## 2018-01-01 RX ORDER — AMLODIPINE BESYLATE 5 MG/1
5 TABLET ORAL DAILY
Status: DISCONTINUED | OUTPATIENT
Start: 2018-01-01 | End: 2018-01-01 | Stop reason: HOSPADM

## 2018-01-01 RX ORDER — HYDRALAZINE HYDROCHLORIDE 50 MG/1
100 TABLET, FILM COATED ORAL 2 TIMES DAILY
Status: DISCONTINUED | OUTPATIENT
Start: 2018-01-01 | End: 2018-01-01 | Stop reason: HOSPADM

## 2018-01-01 RX ORDER — LOPERAMIDE HYDROCHLORIDE 2 MG/1
2 CAPSULE ORAL 4 TIMES DAILY PRN
Status: DISCONTINUED | OUTPATIENT
Start: 2018-01-01 | End: 2018-01-01 | Stop reason: HOSPADM

## 2018-01-01 RX ORDER — ATORVASTATIN CALCIUM 80 MG/1
80 TABLET, FILM COATED ORAL DAILY
Status: DISCONTINUED | OUTPATIENT
Start: 2018-01-01 | End: 2018-01-01 | Stop reason: HOSPADM

## 2018-01-01 RX ORDER — SODIUM CHLORIDE 0.9 % (FLUSH) 0.9 %
10 SYRINGE (ML) INJECTION PRN
Status: DISCONTINUED | OUTPATIENT
Start: 2018-01-01 | End: 2018-01-01 | Stop reason: HOSPADM

## 2018-01-01 RX ORDER — CETIRIZINE HYDROCHLORIDE 5 MG/1
5 TABLET ORAL DAILY
Status: DISCONTINUED | OUTPATIENT
Start: 2018-01-01 | End: 2018-01-01 | Stop reason: HOSPADM

## 2018-01-01 RX ORDER — ACETAMINOPHEN 325 MG/1
650 TABLET ORAL EVERY 4 HOURS PRN
Status: DISCONTINUED | OUTPATIENT
Start: 2018-01-01 | End: 2018-01-01

## 2018-01-01 RX ORDER — NICOTINE POLACRILEX 4 MG
15 LOZENGE BUCCAL PRN
Status: DISCONTINUED | OUTPATIENT
Start: 2018-01-01 | End: 2018-01-01 | Stop reason: HOSPADM

## 2018-01-01 RX ORDER — HYDROCHLOROTHIAZIDE 25 MG/1
25 TABLET ORAL DAILY
Status: DISCONTINUED | OUTPATIENT
Start: 2018-01-01 | End: 2018-01-01 | Stop reason: HOSPADM

## 2018-01-01 RX ORDER — OXYMETAZOLINE HYDROCHLORIDE 0.05 G/100ML
2 SPRAY NASAL 2 TIMES DAILY PRN
Status: DISCONTINUED | OUTPATIENT
Start: 2018-01-01 | End: 2018-01-01 | Stop reason: HOSPADM

## 2018-01-01 RX ORDER — AZELASTINE HYDROCHLORIDE, FLUTICASONE PROPIONATE 137; 50 UG/1; UG/1
2 SPRAY, METERED NASAL 2 TIMES DAILY
Status: DISCONTINUED | OUTPATIENT
Start: 2018-01-01 | End: 2018-01-01 | Stop reason: CLARIF

## 2018-01-01 RX ORDER — SODIUM CHLORIDE 0.9 % (FLUSH) 0.9 %
10 SYRINGE (ML) INJECTION PRN
Status: DISCONTINUED | OUTPATIENT
Start: 2018-01-01 | End: 2018-01-01

## 2018-01-01 RX ORDER — FLUTICASONE PROPIONATE 50 MCG
2 SPRAY, SUSPENSION (ML) NASAL 2 TIMES DAILY
Status: DISCONTINUED | OUTPATIENT
Start: 2018-01-01 | End: 2018-01-01 | Stop reason: HOSPADM

## 2018-01-01 RX ORDER — SODIUM POLYSTYRENE SULFONATE 15 G/60ML
30 SUSPENSION ORAL; RECTAL ONCE
Status: DISCONTINUED | OUTPATIENT
Start: 2018-01-01 | End: 2018-01-01 | Stop reason: RX

## 2018-01-01 RX ORDER — HEPARIN SODIUM 10000 [USP'U]/100ML
12 INJECTION, SOLUTION INTRAVENOUS CONTINUOUS
Status: DISCONTINUED | OUTPATIENT
Start: 2018-01-01 | End: 2018-01-01

## 2018-01-01 RX ORDER — GLIPIZIDE 5 MG/1
5 TABLET ORAL DAILY
Status: DISCONTINUED | OUTPATIENT
Start: 2018-01-01 | End: 2018-01-01

## 2018-01-01 RX ORDER — BACITRACIN, NEOMYCIN, POLYMYXIN B 400; 3.5; 5 [USP'U]/G; MG/G; [USP'U]/G
OINTMENT TOPICAL DAILY
Status: DISCONTINUED | OUTPATIENT
Start: 2018-01-01 | End: 2018-01-01 | Stop reason: HOSPADM

## 2018-01-01 RX ORDER — ZOLPIDEM TARTRATE 5 MG/1
5 TABLET ORAL NIGHTLY PRN
Status: DISCONTINUED | OUTPATIENT
Start: 2018-01-01 | End: 2018-01-01 | Stop reason: HOSPADM

## 2018-01-01 RX ORDER — CETIRIZINE HYDROCHLORIDE 10 MG/1
5 TABLET ORAL DAILY
Status: DISCONTINUED | OUTPATIENT
Start: 2018-01-01 | End: 2018-01-01 | Stop reason: HOSPADM

## 2018-01-01 RX ORDER — HYDROCHLOROTHIAZIDE 25 MG/1
25 TABLET ORAL DAILY
Qty: 30 TABLET | Refills: 3
Start: 2018-01-01

## 2018-01-01 RX ORDER — ACETAMINOPHEN 325 MG/1
650 TABLET ORAL EVERY 4 HOURS PRN
Qty: 120 TABLET | Refills: 3 | Status: SHIPPED | OUTPATIENT
Start: 2018-01-01

## 2018-01-01 RX ORDER — GLIPIZIDE 5 MG/1
10 TABLET ORAL DAILY
Status: DISCONTINUED | OUTPATIENT
Start: 2018-01-01 | End: 2018-01-01

## 2018-01-01 RX ORDER — HEPARIN SODIUM 1000 [USP'U]/ML
4000 INJECTION, SOLUTION INTRAVENOUS; SUBCUTANEOUS ONCE
Status: COMPLETED | OUTPATIENT
Start: 2018-01-01 | End: 2018-01-01

## 2018-01-01 RX ADMIN — BACLOFEN 10 MG: 10 TABLET ORAL at 12:23

## 2018-01-01 RX ADMIN — BACLOFEN 10 MG: 10 TABLET ORAL at 15:27

## 2018-01-01 RX ADMIN — AMLODIPINE BESYLATE 5 MG: 5 TABLET ORAL at 08:20

## 2018-01-01 RX ADMIN — ACETAMINOPHEN 650 MG: 325 TABLET, FILM COATED ORAL at 20:38

## 2018-01-01 RX ADMIN — CETIRIZINE HYDROCHLORIDE 5 MG: 5 TABLET, FILM COATED ORAL at 09:16

## 2018-01-01 RX ADMIN — Medication 10 ML: at 10:24

## 2018-01-01 RX ADMIN — ASPIRIN 81 MG: 81 TABLET, COATED ORAL at 08:43

## 2018-01-01 RX ADMIN — FUROSEMIDE 40 MG: 40 TABLET ORAL at 08:34

## 2018-01-01 RX ADMIN — Medication 10 ML: at 15:30

## 2018-01-01 RX ADMIN — POLYMYXIN B SULFATE, BACITRACIN ZINC, NEOMYCIN SULFATE: 5000; 3.5; 4 OINTMENT TOPICAL at 08:36

## 2018-01-01 RX ADMIN — FUROSEMIDE 40 MG: 40 TABLET ORAL at 08:36

## 2018-01-01 RX ADMIN — VITAMIN D, TAB 1000IU (100/BT) 2000 UNITS: 25 TAB at 08:43

## 2018-01-01 RX ADMIN — FLUTICASONE PROPIONATE 2 SPRAY: 50 SPRAY, METERED NASAL at 21:25

## 2018-01-01 RX ADMIN — ATENOLOL 50 MG: 50 TABLET ORAL at 09:49

## 2018-01-01 RX ADMIN — HEPARIN SODIUM 5000 UNITS: 5000 INJECTION, SOLUTION INTRAVENOUS; SUBCUTANEOUS at 14:07

## 2018-01-01 RX ADMIN — AMLODIPINE BESYLATE 5 MG: 5 TABLET ORAL at 09:13

## 2018-01-01 RX ADMIN — Medication 10 ML: at 19:37

## 2018-01-01 RX ADMIN — HEPARIN SODIUM AND DEXTROSE 12 UNITS/KG/HR: 10000; 5 INJECTION INTRAVENOUS at 14:58

## 2018-01-01 RX ADMIN — ATENOLOL 50 MG: 50 TABLET ORAL at 10:14

## 2018-01-01 RX ADMIN — MICONAZOLE NITRATE: 20.6 POWDER TOPICAL at 10:19

## 2018-01-01 RX ADMIN — HYDRALAZINE HYDROCHLORIDE 100 MG: 50 TABLET, FILM COATED ORAL at 08:43

## 2018-01-01 RX ADMIN — CYANOCOBALAMIN TAB 1000 MCG 1000 MCG: 1000 TAB at 08:17

## 2018-01-01 RX ADMIN — BACLOFEN 10 MG: 10 TABLET ORAL at 13:34

## 2018-01-01 RX ADMIN — ATORVASTATIN CALCIUM 80 MG: 80 TABLET, FILM COATED ORAL at 08:43

## 2018-01-01 RX ADMIN — ASPIRIN 81 MG: 81 TABLET, COATED ORAL at 09:14

## 2018-01-01 RX ADMIN — Medication 10 ML: at 18:02

## 2018-01-01 RX ADMIN — BACLOFEN 10 MG: 10 TABLET ORAL at 09:14

## 2018-01-01 RX ADMIN — TAMSULOSIN HYDROCHLORIDE 0.4 MG: 0.4 CAPSULE ORAL at 09:49

## 2018-01-01 RX ADMIN — MICONAZOLE NITRATE: 20.6 POWDER TOPICAL at 09:18

## 2018-01-01 RX ADMIN — HYDRALAZINE HYDROCHLORIDE 100 MG: 50 TABLET, FILM COATED ORAL at 20:16

## 2018-01-01 RX ADMIN — GLIPIZIDE 10 MG: 5 TABLET ORAL at 12:11

## 2018-01-01 RX ADMIN — HEPARIN SODIUM AND DEXTROSE 4 UNITS/KG/HR: 10000; 5 INJECTION INTRAVENOUS at 10:50

## 2018-01-01 RX ADMIN — ATORVASTATIN CALCIUM 80 MG: 80 TABLET, FILM COATED ORAL at 08:36

## 2018-01-01 RX ADMIN — FUROSEMIDE 40 MG: 40 TABLET ORAL at 09:48

## 2018-01-01 RX ADMIN — BACLOFEN 10 MG: 10 TABLET ORAL at 08:45

## 2018-01-01 RX ADMIN — AZELASTINE HYDROCHLORIDE, FLUTICASONE PROPIONATE 1 SPRAY: 137; 50 SPRAY, METERED NASAL at 21:53

## 2018-01-01 RX ADMIN — MICONAZOLE NITRATE: 20.6 POWDER TOPICAL at 20:33

## 2018-01-01 RX ADMIN — CYANOCOBALAMIN TAB 1000 MCG 1000 MCG: 1000 TAB at 08:36

## 2018-01-01 RX ADMIN — ENOXAPARIN SODIUM 40 MG: 40 INJECTION, SOLUTION INTRAVENOUS; SUBCUTANEOUS at 08:18

## 2018-01-01 RX ADMIN — ATENOLOL 50 MG: 50 TABLET ORAL at 08:35

## 2018-01-01 RX ADMIN — TRAVOPROST OPHTHALMIC SOLUTION 1 DROP: 0.04 SOLUTION OPHTHALMIC at 21:54

## 2018-01-01 RX ADMIN — DEXTROSE MONOHYDRATE: 50 INJECTION, SOLUTION INTRAVENOUS at 11:39

## 2018-01-01 RX ADMIN — ATORVASTATIN CALCIUM 80 MG: 80 TABLET, FILM COATED ORAL at 09:14

## 2018-01-01 RX ADMIN — GLIPIZIDE 5 MG: 5 TABLET ORAL at 08:18

## 2018-01-01 RX ADMIN — SERTRALINE HYDROCHLORIDE 25 MG: 50 TABLET ORAL at 08:18

## 2018-01-01 RX ADMIN — Medication 8.5 MILLICURIE: at 15:30

## 2018-01-01 RX ADMIN — Medication 10 ML: at 08:45

## 2018-01-01 RX ADMIN — ATENOLOL 50 MG: 50 TABLET ORAL at 09:13

## 2018-01-01 RX ADMIN — HYDRALAZINE HYDROCHLORIDE 100 MG: 50 TABLET, FILM COATED ORAL at 20:34

## 2018-01-01 RX ADMIN — BACLOFEN 10 MG: 10 TABLET ORAL at 14:21

## 2018-01-01 RX ADMIN — TRAVOPROST OPHTHALMIC SOLUTION 1 DROP: 0.04 SOLUTION OPHTHALMIC at 19:38

## 2018-01-01 RX ADMIN — Medication 10 ML: at 21:25

## 2018-01-01 RX ADMIN — HEPARIN SODIUM 5000 UNITS: 5000 INJECTION, SOLUTION INTRAVENOUS; SUBCUTANEOUS at 13:34

## 2018-01-01 RX ADMIN — ATORVASTATIN CALCIUM 80 MG: 80 TABLET, FILM COATED ORAL at 08:44

## 2018-01-01 RX ADMIN — AZELASTINE HYDROCHLORIDE, FLUTICASONE PROPIONATE 1 SPRAY: 137; 50 SPRAY, METERED NASAL at 08:33

## 2018-01-01 RX ADMIN — BACLOFEN 10 MG: 10 TABLET ORAL at 21:12

## 2018-01-01 RX ADMIN — HEPARIN SODIUM 5000 UNITS: 5000 INJECTION, SOLUTION INTRAVENOUS; SUBCUTANEOUS at 23:04

## 2018-01-01 RX ADMIN — DEXTROSE MONOHYDRATE: 50 INJECTION, SOLUTION INTRAVENOUS at 03:37

## 2018-01-01 RX ADMIN — BACLOFEN 10 MG: 10 TABLET ORAL at 20:34

## 2018-01-01 RX ADMIN — ASPIRIN 81 MG: 81 TABLET, COATED ORAL at 08:34

## 2018-01-01 RX ADMIN — HEPARIN SODIUM 5000 UNITS: 5000 INJECTION, SOLUTION INTRAVENOUS; SUBCUTANEOUS at 05:37

## 2018-01-01 RX ADMIN — CYANOCOBALAMIN TAB 1000 MCG 1000 MCG: 1000 TAB at 08:43

## 2018-01-01 RX ADMIN — BACLOFEN 10 MG: 10 TABLET ORAL at 09:49

## 2018-01-01 RX ADMIN — Medication 10 ML: at 08:36

## 2018-01-01 RX ADMIN — VITAMIN D, TAB 1000IU (100/BT) 2000 UNITS: 25 TAB at 10:13

## 2018-01-01 RX ADMIN — BACLOFEN 10 MG: 10 TABLET ORAL at 08:35

## 2018-01-01 RX ADMIN — VITAMIN D, TAB 1000IU (100/BT) 2000 UNITS: 25 TAB at 08:35

## 2018-01-01 RX ADMIN — HYDRALAZINE HYDROCHLORIDE 100 MG: 50 TABLET, FILM COATED ORAL at 08:44

## 2018-01-01 RX ADMIN — HEPARIN SODIUM 5000 UNITS: 5000 INJECTION, SOLUTION INTRAVENOUS; SUBCUTANEOUS at 12:23

## 2018-01-01 RX ADMIN — TAMSULOSIN HYDROCHLORIDE 0.4 MG: 0.4 CAPSULE ORAL at 08:45

## 2018-01-01 RX ADMIN — AZELASTINE HYDROCHLORIDE, FLUTICASONE PROPIONATE 1 SPRAY: 137; 50 SPRAY, METERED NASAL at 09:16

## 2018-01-01 RX ADMIN — AZELASTINE HYDROCHLORIDE, FLUTICASONE PROPIONATE 1 SPRAY: 137; 50 SPRAY, METERED NASAL at 20:31

## 2018-01-01 RX ADMIN — ASPIRIN 81 MG: 81 TABLET, COATED ORAL at 08:18

## 2018-01-01 RX ADMIN — HYDRALAZINE HYDROCHLORIDE 100 MG: 50 TABLET, FILM COATED ORAL at 20:32

## 2018-01-01 RX ADMIN — CYANOCOBALAMIN TAB 1000 MCG 1000 MCG: 1000 TAB at 09:14

## 2018-01-01 RX ADMIN — ATORVASTATIN CALCIUM 80 MG: 80 TABLET, FILM COATED ORAL at 10:14

## 2018-01-01 RX ADMIN — AMLODIPINE BESYLATE 5 MG: 5 TABLET ORAL at 08:44

## 2018-01-01 RX ADMIN — LATANOPROST 1 DROP: 50 SOLUTION OPHTHALMIC at 22:12

## 2018-01-01 RX ADMIN — ATENOLOL 50 MG: 50 TABLET ORAL at 08:18

## 2018-01-01 RX ADMIN — ENOXAPARIN SODIUM 30 MG: 30 INJECTION SUBCUTANEOUS at 08:44

## 2018-01-01 RX ADMIN — AMLODIPINE BESYLATE 5 MG: 5 TABLET ORAL at 08:34

## 2018-01-01 RX ADMIN — AMLODIPINE BESYLATE 5 MG: 5 TABLET ORAL at 08:42

## 2018-01-01 RX ADMIN — VITAMIN D, TAB 1000IU (100/BT) 2000 UNITS: 25 TAB at 08:18

## 2018-01-01 RX ADMIN — OXYMETAZOLINE HYDROCHLORIDE 2 SPRAY: 5 SPRAY NASAL at 09:29

## 2018-01-01 RX ADMIN — ATORVASTATIN CALCIUM 80 MG: 80 TABLET, FILM COATED ORAL at 08:18

## 2018-01-01 RX ADMIN — CYANOCOBALAMIN TAB 1000 MCG 1000 MCG: 1000 TAB at 09:48

## 2018-01-01 RX ADMIN — MICONAZOLE NITRATE: 20.6 POWDER TOPICAL at 09:50

## 2018-01-01 RX ADMIN — HYDRALAZINE HYDROCHLORIDE 100 MG: 50 TABLET, FILM COATED ORAL at 21:12

## 2018-01-01 RX ADMIN — HYDRALAZINE HYDROCHLORIDE 100 MG: 50 TABLET, FILM COATED ORAL at 08:35

## 2018-01-01 RX ADMIN — HYDRALAZINE HYDROCHLORIDE 100 MG: 50 TABLET, FILM COATED ORAL at 09:49

## 2018-01-01 RX ADMIN — FUROSEMIDE 40 MG: 10 INJECTION, SOLUTION INTRAVENOUS at 13:41

## 2018-01-01 RX ADMIN — MICONAZOLE NITRATE: 20.6 POWDER TOPICAL at 19:38

## 2018-01-01 RX ADMIN — MICONAZOLE NITRATE: 20.6 POWDER TOPICAL at 00:30

## 2018-01-01 RX ADMIN — ATENOLOL 50 MG: 50 TABLET ORAL at 08:45

## 2018-01-01 RX ADMIN — FLUTICASONE PROPIONATE 2 SPRAY: 50 SPRAY, METERED NASAL at 23:08

## 2018-01-01 RX ADMIN — VITAMIN D, TAB 1000IU (100/BT) 2000 UNITS: 25 TAB at 09:48

## 2018-01-01 RX ADMIN — LATANOPROST 1 DROP: 50 SOLUTION OPHTHALMIC at 21:25

## 2018-01-01 RX ADMIN — GLIPIZIDE 5 MG: 5 TABLET ORAL at 09:14

## 2018-01-01 RX ADMIN — BACLOFEN 10 MG: 10 TABLET ORAL at 14:07

## 2018-01-01 RX ADMIN — LOSARTAN POTASSIUM 25 MG: 25 TABLET ORAL at 10:15

## 2018-01-01 RX ADMIN — MICONAZOLE NITRATE: 20.6 POWDER TOPICAL at 20:42

## 2018-01-01 RX ADMIN — BACLOFEN 10 MG: 10 TABLET ORAL at 08:17

## 2018-01-01 RX ADMIN — Medication 10 ML: at 08:19

## 2018-01-01 RX ADMIN — CETIRIZINE HYDROCHLORIDE 5 MG: 10 TABLET, FILM COATED ORAL at 08:36

## 2018-01-01 RX ADMIN — BACLOFEN 10 MG: 10 TABLET ORAL at 16:09

## 2018-01-01 RX ADMIN — VITAMIN D, TAB 1000IU (100/BT) 2000 UNITS: 25 TAB at 09:14

## 2018-01-01 RX ADMIN — HYDRALAZINE HYDROCHLORIDE 100 MG: 50 TABLET, FILM COATED ORAL at 22:13

## 2018-01-01 RX ADMIN — HEPARIN SODIUM 5000 UNITS: 5000 INJECTION, SOLUTION INTRAVENOUS; SUBCUTANEOUS at 06:01

## 2018-01-01 RX ADMIN — CETIRIZINE HYDROCHLORIDE 5 MG: 5 TABLET, FILM COATED ORAL at 08:35

## 2018-01-01 RX ADMIN — FUROSEMIDE 40 MG: 10 INJECTION, SOLUTION INTRAMUSCULAR; INTRAVENOUS at 17:59

## 2018-01-01 RX ADMIN — ATORVASTATIN CALCIUM 80 MG: 80 TABLET, FILM COATED ORAL at 09:48

## 2018-01-01 RX ADMIN — Medication 10 ML: at 20:35

## 2018-01-01 RX ADMIN — CETIRIZINE HYDROCHLORIDE 5 MG: 10 TABLET, FILM COATED ORAL at 09:48

## 2018-01-01 RX ADMIN — ATENOLOL 50 MG: 50 TABLET ORAL at 08:34

## 2018-01-01 RX ADMIN — AZELASTINE HYDROCHLORIDE, FLUTICASONE PROPIONATE 1 SPRAY: 137; 50 SPRAY, METERED NASAL at 19:38

## 2018-01-01 RX ADMIN — BACLOFEN 10 MG: 10 TABLET ORAL at 08:43

## 2018-01-01 RX ADMIN — FUROSEMIDE 40 MG: 40 TABLET ORAL at 08:18

## 2018-01-01 RX ADMIN — MICONAZOLE NITRATE: 20.6 POWDER TOPICAL at 08:37

## 2018-01-01 RX ADMIN — MICONAZOLE NITRATE: 20.6 POWDER TOPICAL at 22:12

## 2018-01-01 RX ADMIN — GLIPIZIDE 5 MG: 5 TABLET ORAL at 08:43

## 2018-01-01 RX ADMIN — SERTRALINE HYDROCHLORIDE 25 MG: 50 TABLET ORAL at 08:43

## 2018-01-01 RX ADMIN — BACLOFEN 10 MG: 10 TABLET ORAL at 08:34

## 2018-01-01 RX ADMIN — TAMSULOSIN HYDROCHLORIDE 0.4 MG: 0.4 CAPSULE ORAL at 08:18

## 2018-01-01 RX ADMIN — AMLODIPINE BESYLATE 5 MG: 5 TABLET ORAL at 10:14

## 2018-01-01 RX ADMIN — HYDRALAZINE HYDROCHLORIDE 100 MG: 50 TABLET, FILM COATED ORAL at 09:14

## 2018-01-01 RX ADMIN — DEXTROSE MONOHYDRATE 12.5 G: 25 INJECTION, SOLUTION INTRAVENOUS at 11:58

## 2018-01-01 RX ADMIN — DEXTROSE MONOHYDRATE: 50 INJECTION, SOLUTION INTRAVENOUS at 09:04

## 2018-01-01 RX ADMIN — HEPARIN SODIUM 4000 UNITS: 1000 INJECTION, SOLUTION INTRAVENOUS; SUBCUTANEOUS at 14:53

## 2018-01-01 RX ADMIN — BACLOFEN 10 MG: 10 TABLET ORAL at 14:02

## 2018-01-01 RX ADMIN — MICONAZOLE NITRATE: 20.6 POWDER TOPICAL at 08:44

## 2018-01-01 RX ADMIN — ASPIRIN 81 MG: 81 TABLET, COATED ORAL at 20:34

## 2018-01-01 RX ADMIN — CLOPIDOGREL BISULFATE 75 MG: 75 TABLET ORAL at 09:49

## 2018-01-01 RX ADMIN — Medication 40.5 MILLICURIE: at 15:15

## 2018-01-01 RX ADMIN — CYANOCOBALAMIN TAB 1000 MCG 1000 MCG: 1000 TAB at 10:14

## 2018-01-01 RX ADMIN — CYANOCOBALAMIN TAB 1000 MCG 1000 MCG: 1000 TAB at 08:34

## 2018-01-01 RX ADMIN — BACLOFEN 10 MG: 10 TABLET ORAL at 15:04

## 2018-01-01 RX ADMIN — MICONAZOLE NITRATE: 20.6 POWDER TOPICAL at 08:34

## 2018-01-01 RX ADMIN — CETIRIZINE HYDROCHLORIDE 5 MG: 5 TABLET, FILM COATED ORAL at 10:19

## 2018-01-01 RX ADMIN — DEXTROSE MONOHYDRATE: 50 INJECTION, SOLUTION INTRAVENOUS at 06:13

## 2018-01-01 RX ADMIN — FLUTICASONE PROPIONATE 2 SPRAY: 50 SPRAY, METERED NASAL at 08:36

## 2018-01-01 RX ADMIN — HYDRALAZINE HYDROCHLORIDE 100 MG: 50 TABLET, FILM COATED ORAL at 08:34

## 2018-01-01 RX ADMIN — CETIRIZINE HYDROCHLORIDE 5 MG: 5 TABLET, FILM COATED ORAL at 08:45

## 2018-01-01 RX ADMIN — AMLODIPINE BESYLATE 5 MG: 5 TABLET ORAL at 08:35

## 2018-01-01 RX ADMIN — AZELASTINE HYDROCHLORIDE, FLUTICASONE PROPIONATE 1 SPRAY: 137; 50 SPRAY, METERED NASAL at 20:34

## 2018-01-01 RX ADMIN — SERTRALINE HYDROCHLORIDE 25 MG: 50 TABLET ORAL at 08:34

## 2018-01-01 RX ADMIN — CLOPIDOGREL BISULFATE 75 MG: 75 TABLET ORAL at 08:36

## 2018-01-01 RX ADMIN — ZOLPIDEM TARTRATE 5 MG: 5 TABLET, FILM COATED ORAL at 23:47

## 2018-01-01 RX ADMIN — AZELASTINE HYDROCHLORIDE, FLUTICASONE PROPIONATE 1 SPRAY: 137; 50 SPRAY, METERED NASAL at 08:19

## 2018-01-01 RX ADMIN — BACLOFEN 10 MG: 10 TABLET ORAL at 22:12

## 2018-01-01 RX ADMIN — Medication 10 ML: at 18:00

## 2018-01-01 RX ADMIN — BACLOFEN 10 MG: 10 TABLET ORAL at 20:32

## 2018-01-01 RX ADMIN — HYDRALAZINE HYDROCHLORIDE 100 MG: 50 TABLET, FILM COATED ORAL at 08:18

## 2018-01-01 RX ADMIN — HYDRALAZINE HYDROCHLORIDE 100 MG: 50 TABLET, FILM COATED ORAL at 19:36

## 2018-01-01 RX ADMIN — TRAVOPROST OPHTHALMIC SOLUTION 1 DROP: 0.04 SOLUTION OPHTHALMIC at 20:34

## 2018-01-01 RX ADMIN — CLOPIDOGREL BISULFATE 75 MG: 75 TABLET ORAL at 08:44

## 2018-01-01 RX ADMIN — HEPARIN SODIUM 5000 UNITS: 5000 INJECTION, SOLUTION INTRAVENOUS; SUBCUTANEOUS at 21:12

## 2018-01-01 RX ADMIN — HYDROCHLOROTHIAZIDE 25 MG: 25 TABLET ORAL at 08:36

## 2018-01-01 RX ADMIN — BACLOFEN 10 MG: 10 TABLET ORAL at 10:14

## 2018-01-01 RX ADMIN — FLUTICASONE PROPIONATE 2 SPRAY: 50 SPRAY, METERED NASAL at 09:49

## 2018-01-01 RX ADMIN — Medication 10 ML: at 09:15

## 2018-01-01 RX ADMIN — FUROSEMIDE 40 MG: 10 INJECTION, SOLUTION INTRAMUSCULAR; INTRAVENOUS at 08:44

## 2018-01-01 RX ADMIN — CETIRIZINE HYDROCHLORIDE 5 MG: 5 TABLET, FILM COATED ORAL at 08:18

## 2018-01-01 RX ADMIN — FUROSEMIDE 40 MG: 10 INJECTION, SOLUTION INTRAMUSCULAR; INTRAVENOUS at 18:02

## 2018-01-01 RX ADMIN — ACETAMINOPHEN 650 MG: 325 TABLET, FILM COATED ORAL at 11:23

## 2018-01-01 RX ADMIN — ATORVASTATIN CALCIUM 80 MG: 80 TABLET, FILM COATED ORAL at 08:34

## 2018-01-01 RX ADMIN — CETIRIZINE HYDROCHLORIDE 5 MG: 10 TABLET, FILM COATED ORAL at 08:45

## 2018-01-01 RX ADMIN — VITAMIN D, TAB 1000IU (100/BT) 2000 UNITS: 25 TAB at 08:33

## 2018-01-01 RX ADMIN — SERTRALINE HYDROCHLORIDE 25 MG: 50 TABLET ORAL at 09:14

## 2018-01-01 RX ADMIN — FLUTICASONE PROPIONATE 2 SPRAY: 50 SPRAY, METERED NASAL at 20:16

## 2018-01-01 RX ADMIN — Medication 10 ML: at 08:35

## 2018-01-01 RX ADMIN — BACLOFEN 10 MG: 10 TABLET ORAL at 20:16

## 2018-01-01 RX ADMIN — FLUTICASONE PROPIONATE 2 SPRAY: 50 SPRAY, METERED NASAL at 08:46

## 2018-01-01 RX ADMIN — FUROSEMIDE 40 MG: 10 INJECTION, SOLUTION INTRAMUSCULAR; INTRAVENOUS at 06:58

## 2018-01-01 RX ADMIN — DEXTROSE MONOHYDRATE 12.5 G: 25 INJECTION, SOLUTION INTRAVENOUS at 16:42

## 2018-01-01 RX ADMIN — MICONAZOLE NITRATE: 20.6 POWDER TOPICAL at 08:18

## 2018-01-01 RX ADMIN — MICONAZOLE NITRATE: 20.6 POWDER TOPICAL at 21:54

## 2018-01-01 RX ADMIN — AZELASTINE HYDROCHLORIDE, FLUTICASONE PROPIONATE 1 SPRAY: 137; 50 SPRAY, METERED NASAL at 08:44

## 2018-01-01 RX ADMIN — ACETAMINOPHEN 650 MG: 325 TABLET, FILM COATED ORAL at 21:11

## 2018-01-01 RX ADMIN — TAMSULOSIN HYDROCHLORIDE 0.4 MG: 0.4 CAPSULE ORAL at 08:36

## 2018-01-01 RX ADMIN — AMLODIPINE BESYLATE 5 MG: 5 TABLET ORAL at 09:49

## 2018-01-01 RX ADMIN — HYDRALAZINE HYDROCHLORIDE 100 MG: 50 TABLET, FILM COATED ORAL at 10:14

## 2018-01-01 RX ADMIN — ATENOLOL 50 MG: 50 TABLET ORAL at 08:43

## 2018-01-01 RX ADMIN — ENOXAPARIN SODIUM 30 MG: 30 INJECTION SUBCUTANEOUS at 09:13

## 2018-01-01 RX ADMIN — HYDRALAZINE HYDROCHLORIDE 100 MG: 50 TABLET, FILM COATED ORAL at 21:53

## 2018-01-01 RX ADMIN — LATANOPROST 1 DROP: 50 SOLUTION OPHTHALMIC at 20:16

## 2018-01-01 RX ADMIN — TRAVOPROST OPHTHALMIC SOLUTION 1 DROP: 0.04 SOLUTION OPHTHALMIC at 20:31

## 2018-01-01 RX ADMIN — VITAMIN D, TAB 1000IU (100/BT) 2000 UNITS: 25 TAB at 08:44

## 2018-01-01 RX ADMIN — BACLOFEN 10 MG: 10 TABLET ORAL at 21:53

## 2018-01-01 RX ADMIN — FUROSEMIDE 40 MG: 40 TABLET ORAL at 08:45

## 2018-01-01 RX ADMIN — CYANOCOBALAMIN TAB 1000 MCG 1000 MCG: 1000 TAB at 08:45

## 2018-01-01 RX ADMIN — TAMSULOSIN HYDROCHLORIDE 0.4 MG: 0.4 CAPSULE ORAL at 08:34

## 2018-01-01 RX ADMIN — BACLOFEN 10 MG: 10 TABLET ORAL at 19:36

## 2018-01-01 RX ADMIN — DEXTROSE MONOHYDRATE: 50 INJECTION, SOLUTION INTRAVENOUS at 15:23

## 2018-01-01 RX ADMIN — SERTRALINE HYDROCHLORIDE 25 MG: 50 TABLET ORAL at 10:15

## 2018-01-01 RX ADMIN — MICONAZOLE NITRATE: 20.6 POWDER TOPICAL at 08:45

## 2018-01-01 ASSESSMENT — ENCOUNTER SYMPTOMS
VOMITING: 0
NAUSEA: 0
FACIAL SWELLING: 0
COUGH: 0
BLOOD IN STOOL: 0
CHEST TIGHTNESS: 0
SORE THROAT: 0
ABDOMINAL PAIN: 0
WHEEZING: 0
PHOTOPHOBIA: 0
NAUSEA: 0
DIARRHEA: 0
CONSTIPATION: 0
SHORTNESS OF BREATH: 1
COUGH: 0
COLOR CHANGE: 0
VOMITING: 0
ABDOMINAL PAIN: 0
WHEEZING: 0
SHORTNESS OF BREATH: 0
NAUSEA: 0
BACK PAIN: 0
ABDOMINAL DISTENTION: 0
EYE PAIN: 0
ABDOMINAL PAIN: 0
SHORTNESS OF BREATH: 0
VOMITING: 0
DIARRHEA: 0
SORE THROAT: 0
RECTAL PAIN: 0

## 2018-01-01 ASSESSMENT — PAIN SCALES - GENERAL
PAINLEVEL_OUTOF10: 0
PAINLEVEL_OUTOF10: 1
PAINLEVEL_OUTOF10: 2
PAINLEVEL_OUTOF10: 6
PAINLEVEL_OUTOF10: 0
PAINLEVEL_OUTOF10: 4
PAINLEVEL_OUTOF10: 6
PAINLEVEL_OUTOF10: 0

## 2018-07-18 PROBLEM — Z91.09 ENVIRONMENTAL ALLERGIES: Status: ACTIVE | Noted: 2018-01-01

## 2018-07-23 PROBLEM — I50.9 CONGESTIVE HEART FAILURE (HCC): Status: ACTIVE | Noted: 2018-01-01

## 2018-07-23 PROBLEM — N17.9 AKI (ACUTE KIDNEY INJURY) (HCC): Status: ACTIVE | Noted: 2018-01-01

## 2018-07-23 PROBLEM — R74.8 ELEVATED LIVER ENZYMES: Status: ACTIVE | Noted: 2018-01-01

## 2018-07-23 NOTE — CONSULTS
MCG/0.5ML injection Inject into the skin three times a week    Isaac Mazariegos MD        [facility] Meds:    Current Facility-Administered Medications   Medication Dose Route Frequency Provider Last Rate Last Dose    sodium chloride flush 0.9 % injection 10 mL  10 mL Intravenous 2 times per day Ray Grover DO   10 mL at 07/23/18 1024    sodium chloride flush 0.9 % injection 10 mL  10 mL Intravenous PRN Ray Grover DO        acetaminophen (TYLENOL) tablet 650 mg  650 mg Oral Q4H PRN Ray Grover DO        amLODIPine (NORVASC) tablet 5 mg  5 mg Oral Daily Isaac Mazariegos MD   5 mg at 07/23/18 1014    atenolol (TENORMIN) tablet 50 mg  50 mg Oral Daily Isaac Mazariegos MD   50 mg at 07/23/18 1014    atorvastatin (LIPITOR) tablet 80 mg  80 mg Oral Daily Isaac Mazariegos MD   80 mg at 07/23/18 1014    baclofen (LIORESAL) tablet 10 mg  10 mg Oral TID Isaac Mazariegos MD   10 mg at 07/23/18 1609    bisacodyl (DULCOLAX) suppository 10 mg  10 mg Rectal Daily PRN Isaac Mazariegos MD        vitamin D (CHOLECALCIFEROL) tablet 2,000 Units  2,000 Units Oral Daily Isaac Mazariegos MD   2,000 Units at 07/23/18 1013    vitamin B-12 (CYANOCOBALAMIN) tablet 1,000 mcg  1,000 mcg Oral Daily Isaac Mazariegos MD   1,000 mcg at 07/23/18 1014    hydrALAZINE (APRESOLINE) tablet 100 mg  100 mg Oral BID Isaac Mazariegos MD   100 mg at 07/23/18 1014    interferon beta-1a (REBIF) injection 44 mcg  44 mcg Subcutaneous Once per day on Mon Wed Fri Isaac Mazariegos MD        cetirizine (ZYRTEC) tablet 5 mg  5 mg Oral Daily Isaac Mazariegos MD   5 mg at 07/23/18 1019    sertraline (ZOLOFT) tablet 25 mg  25 mg Oral Daily Isaac Mazariegos MD   25 mg at 07/23/18 1015    latanoprost (XALATAN) 0.005 % ophthalmic solution 1 drop  1 drop Both Eyes Nightly Isaac Mazariegos MD        enoxaparin (LOVENOX) injection 40 mg  40 mg Subcutaneous Daily Isaac Mazariegos MD        zolpidem [facility] tablet 5 mg  5 mg Oral Nightly PRN Moreno Lu MD        losartan (COZAAR) tablet 25 mg  25 mg Oral Daily Moreno Lu MD   25 mg at 07/23/18 1015    miconazole (MICOTIN) 2 % powder   Topical BID Moreno Lu MD        glucose (GLUTOSE) 40 % oral gel 15 g  15 g Oral PRN Moreno Lu MD        dextrose 50 % solution 12.5 g  12.5 g Intravenous PRN Moreno Lu MD        glucagon (rDNA) injection 1 mg  1 mg Intramuscular PRN Moreno Lu MD        dextrose 5 % solution  100 mL/hr Intravenous PRN Moreno Lu MD        insulin lispro (HUMALOG) injection vial 0-12 Units  0-12 Units Subcutaneous TID WC Moreno Lu MD        insulin lispro (HUMALOG) injection vial 0-6 Units  0-6 Units Subcutaneous Nightly Moreno Lu MD        glipiZIDE (GLUCOTROL) tablet 10 mg  10 mg Oral Daily Moreno Lu MD   10 mg at 07/23/18 1211    fluticasone (FLONASE) 50 MCG/ACT nasal spray 2 spray  2 spray Each Nare BID Moreno Lu MD        furosemide (LASIX) injection 40 mg  40 mg Intravenous BID Jennifer Mac MD           Social History:    Social History     Social History    Marital status: Legally      Spouse name: N/A    Number of children: N/A    Years of education: N/A     Social History Main Topics    Smoking status: Never Smoker    Smokeless tobacco: Never Used    Alcohol use No    Drug use: No    Sexual activity: Not Asked     Other Topics Concern    None     Social History Narrative    None       Family History:    Family History   Problem Relation Age of Onset    Alzheimer's Disease Mother     Cancer Father         throat       Review of Systems:      · Constitutional: no weight loss or gain, no fever or chills. · Eyes: No new visual changes. · ENT: No new hearing loss. · Cardiovascular: see HPI. · Respiratory: No cough. No hemoptysis.     · Gastrointestinal: No abdominal pain, no blood in

## 2018-07-23 NOTE — ED NOTES
Pt and bed linen saturated with urine; clarita-care and clean linens applied; pt tolerated well. Red areas with purulent drainage noted bilateral groin that resemble yeast. Dry brief applied.      Tadeo Zamora RN  07/23/18 1053

## 2018-07-23 NOTE — SIGNIFICANT EVENT
HISTORICAL DATA ONLY - OFFICE VISIT NOTE DATED  3/11/2016  IN OUR OFFICE EHR - RKA 7/23/2018. Progress Notes  Encounter Date: 3/11/2016  Isabel Sepulveda MD   Cardiology            Ranjan Faith     Date of visit:  03/11/2016         YOB: 1950  Age:  77years old            MRN:  [id number]  ____________________________  CURRENT DIAGNOSES  1. Other specified diabetes mellitus without complications  2. Hyperlipidemia, unspecified  3. Essential (primary) hypertension  4. Ischemic cardiomyopathy  5. Atherosclerosis of autologous vein coronary artery bypass graft(s) with unspecified angina pectoris  6. Ventricular tachycardia  7. Occlusion and stenosis of unspecified carotid artery  8. Peripheral vascular disease, unspecified  9. Presence of automatic (implantable) cardiac defibrillator  ____________________________  ALLERGIES   Brimonidine, Intolerance-unknown  ____________________________  MEDICATIONS  1. Acetaminophen Extra Strength 500 mg tablet, as needed  2. amlodipine 2.5 mg tablet, 1 by mouth daily  3. Aspirin Low Dose 81 mg tablet,delayed release, 1 by mouth daily  4. atenolol 50 mg tablet, 1 by mouth at bedtime  5. atorvastatin 80 mg tablet, 1 by mouth daily  6. baclofen 10 mg tablet, 1 by mouth three times daily  7. cholecalciferol (vitamin D3) 2,000 unit tablet, 1 by mouth daily  8. Cialis 20 mg tablet, as needed  9. clopidogrel 75 mg tablet, 1 by mouth daily  10. cyanocobalamin (vit B-12) 1,000 mcg tablet, 1 by mouth daily  11. glyburide 2.5 mg tablet, 1 by mouth daily  12. hydralazine 100 mg tablet, 1 by mouth twice daily  13. lisinopril 20 mg-hydrochlorothiazide 25 mg tablet, 1 by mouth daily  14.  Rebif 44 mcg/0.5 mL syringe, 0.5mL subcutaneous 1 time per day Mon, Wed, Fri, Sat  ____________________________  CHIEF COMPLAINTS  Followup of Atherosclerosis of autologous vein coronary artery bypass graft(s) with unspecified bogdan, Followup of Ventricular tachycardia and Hospital follow
and judgement  NEUROLOGICAL: no gross motor or sensory deficits noted  _______________________  VITAL SIGNS:  /80 (BP Site: Left Arm, BP Postition: Sitting, BP CUFF SIZE: M (9-13 inches))   Pulse 70   Ht 175.3 cm (5' 9\")   Wt 78.9 kg (174 lb)   BMI 25.70 kg/m²   _______________________  No orders of the defined types were placed in this encounter.     There are no discontinued medications. _______________________  IMPRESSIONS/PLAN       Encounter Diagnosis   Name Primary?  Tachycardia Yes         Very pleasant gentleman 79years of age with ischemic heart disease history of recurrent VT. He is doing well. His ICD is appropriately programmed.     He is on excellent medical therapy for his coronary disease.     His riata lead which has been recalled is stable. It is functioning normally. We have him on the appropriate monitoring regimen. Will see him again in 6 months time for his cardiovascular concerns. _______________________  Rita Froilan  No orders of the defined types were placed in this encounter.     _______________________  FOLLOW UP  No Follow-up on file.     PCP: Jordan Caldera MD  Referring Physician: Jordan Caldera MD  [address], \[facility] [place] [facility]\""[address]     [place], [place]  02/16/18 1431         Electronically signed by Gricelda Jaimes DO at 02/16/18 1526      HISTORICAL DATA ONLY - MOST RECENT OFFICE VISIT NOTE DATED  2/16/2018   IN OUR OFFICE EHR - RKA 7/23/2018.

## 2018-07-23 NOTE — H&P
[facility]        History and Physical Examination     CHIEF COMPLAINT:   Chief Complaint   Patient presents with    Shortness of Breath       History Obtained From:  Patient     HISTORY OF PRESENT ILLNESS:      The patient is a  76 y.o. male with significant medical history of CAD presented with shortness of breath. States started around 3 am. Hand no chest pain, nausea or diaphoresis. No cough, fever or chills. No light headed. No nausea or vomiting. Past Medical History:        Diagnosis Date    MS (multiple sclerosis) ([facility] [address]        Past Surgical History:        Procedure Laterality Date    BYPASS GRAFT      using vein    CORONARY ANGIOPLASTY WITH STENT PLACEMENT      PACEMAKER PLACEMENT         Medications Prior to Admission:    Prescriptions Prior to Admission: furosemide (LASIX) 20 MG tablet, Take 1 tablet by mouth daily  Potassium 99 MG TABS, Take 198 mg by mouth daily ( 2 tablets daily)  glipiZIDE (GLUCOTROL) 10 MG tablet, Take 1 tablet by mouth daily  Azelastine-Fluticasone (DYMISTA) 137-50 MCG/ACT SUSP, 2 actuation by Nasal route 2 times daily  Travoprost, BAK Free, (TRAVATAN Z) 0.004 % SOLN ophthalmic solution, Place 1 drop into both eyes daily  bisacodyl (DULCOLAX) 10 MG suppository, Place 10 mg rectally daily as needed for Constipation  loperamide (IMODIUM) 2 MG capsule, 2 tablet by mouth after first bout of diarrhea, then 1 tablet PO after each loose stool  sertraline (ZOLOFT) 25 MG tablet, Take 1 tablet by mouth daily for 7 days X 1 week. Then increase dose to 50 mg - 1 tablet daily.   loratadine (CLARITIN) 10 MG capsule, Take 10 mg by mouth daily  glyBURIDE (DIABETA) 2.5 MG tablet, Take 2.5 mg by mouth daily (with breakfast)  tadalafil (CIALIS) 5 MG tablet, Take 4 tablets by mouth as needed for Erectile Dysfunction  amLODIPine (NORVASC) 5 MG tablet, Take 1 tablet by mouth daily  acetaminophen (TYLENOL) 500 MG tablet, Take 500 mg by mouth  aspirin 81 MG EC tablet, Take 81 mg by mouth  clopidogrel (PLAVIX) 75 MG tablet, Take 75 mg by mouth  Cholecalciferol 2000 UNITS TABS, Take 1 tablet by mouth daily  cyanocobalamin 1000 MCG tablet, Take 1 tablet by mouth daily  atenolol (TENORMIN) 50 MG tablet, Take 50 mg by mouth daily  atorvastatin (LIPITOR) 80 MG tablet, Take 80 mg by mouth daily  baclofen (LIORESAL) 10 MG tablet, Take 10 mg by mouth 3 times daily  hydrALAZINE (APRESOLINE) 100 MG tablet, Take 100 mg by mouth 2 times daily  lisinopril-hydrochlorothiazide (PRINZIDE;ZESTORETIC) 20-25 MG per tablet, Take 1 tablet by mouth daily  interferon beta-1a (REBIF) 44 MCG/0.5ML injection, Inject into the skin three times a week    Allergies:  Alphagan [brimonidine]    Immunizations:   Immunization History   Administered Date(s) Administered    Influenza Virus Vaccine 10/19/2015    Influenza, High Dose 09/02/2016, 09/25/2017    Pneumococcal 13-valent Conjugate ([id number]) 09/02/2016    Pneumococcal Polysaccharide ([id number]) 08/14/2015    Tdap (Boostrix, Adacel) 02/19/2012       Social History:    reports that he has never smoked. He has never used smokeless tobacco. He reports that he does not drink alcohol or use drugs.     Family History:   Family History   Problem Relation Age of Onset   [facility] Alzheimer's Disease Mother     Cancer Father         throat       REVIEW OF SYSTEMS:  RESPIRATORY:  Positive for shortness of breath  CARDIOVASCULAR:  negative for  chest pain, dyspnea, palpitations, orthopnea, exertional chest pressure/discomfort, fatigue, edema, syncope positive for    GASTROINTESTINAL:  negative for nausea, vomiting, change in bowel habits, diarrhea, constipation, abdominal pain and reflux positive for   GENITOURINARY:  negative for frequency, dysuria, nocturia, urinary incontinence and hesitancy positive for   HEMATOLOGIC/LYMPHATIC:  negative for easy bruising, bleeding and swelling/edemapositive for   ENDOCRINE:  negative for weight changes, change in bowel habits and diabetic symptoms including neither polyuria nor polydipsia nor blurred vision nor foot ulcerations nor neuropathypositive for   MUSCULOSKELETAL:  negative for  myalgias, arthralgias, pain, joint swelling, stiff joints and muscle weakness positive for    NEUROLOGICAL:  negative for headaches, dizziness, memory problems, speech problems, visual disturbance, gait problems, weakness and numbness positive for     Vitals:  BP (!) 147/68   Pulse 60   Temp 94.6 °F (34.8 °C) (Axillary)   Resp 12   Ht 5' 9\" (1.753 m)   Wt 174 lb (78.9 kg)   SpO2 98%   BMI 25.70 kg/m²     PHYSICAL EXAM:    CONSTITUTIONAL:  awake, alert, cooperative, no apparent distress, and appears stated age{CONSTITUTIONAL:[id number]  EYES:  Lids and lashes normal, pupils equal, round and reactive to light, extra ocular muscles intact, sclera clear, conjunctiva normal  Lids and lashes normal, pupils equal, round and reactive to light, extra ocular muscles intact, sclera clear, conjunctiva normal  ENT:  Normocephalic, without obvious abnormality, atraumatic, sinuses nontender on palpation, external ears without lesions, oral pharynx with moist mucus membranes, tonsils without erythema or exudates,  Normocephalic, without obvious abnormality, atraumatic, sinuses nontender on palpation, external ears without lesions, oral pharynx with moist mucus membranes, tonsils without erythema or exudates, gums normal and good dentition.   NECK:  Supple, symmetrical, trachea midline, no adenopathy, thyroid symmetric, not enlarged and no tenderness, skin normal  Supple, symmetrical, trachea midline, no adenopathy, thyroid symmetric, not enlarged and no tenderness, skin normal  HEMATOLOGIC/LYMPHATICS:  no cervical lymphadenopathy no cervical lymphadenopathy  BACK:  Symmetric, no curvature, spinous processes are non-tender on palpation, paraspinous muscles are non-tender on palpation, no costal vertebral tenderness  Symmetric, no curvature, spinous processes are non-tender on palpation, paraspinous muscles are non-tender on palpation, no costal vertebral tenderness  LUNGS:  No increased work of breathing, good air exchange, clear to auscultation bilaterally, no crackles or wheezing No increased work of breathing, good air exchange, clear to auscultation bilaterally, no crackles or wheezing  CARDIOVASCULAR:  Normal apical impulse, regular rate and rhythm, normal S1 and S2, no S3 or S4, and no murmur noted  Normal apical impulse, regular rate and rhythm, normal S1 and S2, no S3 or S4, and no murmur noted . 2+ edema lower extremities  ABDOMEN:  No scars, normal bowel sounds, soft, non-distended, non-tender, no masses palpated, no hepatosplenomegally  No scars, normal bowel sounds, soft, non-distended, non-tender, no masses palpated, no hepatosplenomegally  MUSCULOSKELETAL:  There is no redness, warmth, or swelling of the joints. Full range of motion noted. Tone is normal. There is no redness, warmth, or swelling of the joints. Full range of motion noted. Motor strength is 5 out of 5 all extremities bilaterally. Tone is normal.  NEUROLOGIC:  Awake, alert, oriented to name, place and time. Cranial nerves II-XII are grossly intact. Motor is 5 out of 5 bilaterally. Sensory is intact. gait is normal.   Awake, alert, oriented to name, place and time. Cranial nerves II-XII are grossly intact. Motor is 5 out of 5 bilaterally. Cerebellar finger to nose, heel to shin intact. Sensory is intact.   Babinski down going, Romberg negative, and gait is normal.    SKIN:  no bruising or bleeding, normal skin color, texture, turgor, no redness, warmth,  and no rashes no bruising or bleeding      DATA:  Labs and Data reviewed      ASSESSMENT:  · chf by cxr  · CAD  · Elevated liver enzymes  · DANIEL  · Diabetes mellitus  · Edema      PLAN:  · Consult cardiology and nephrology   · Labs ordered  · Sliding scale insulin  · Repeat labs in am  · Fluids and diuretics per nephrology  · Echo ordered  ·

## 2018-07-23 NOTE — ED NOTES
PT arrive to ed via ems. Per report from ems, onset of SOB approx 0200 at independent living facility. Per ems, pt was on 3L of O2 on ems arrival with 97% O2 sat for ems. Ems reports pt denied chest pain. Pt denies chest pain to this writer. Pt report SOB woke him up from sleep and feels like he \"cant get enough air in\".      Sary Garcia RN  07/23/18 5878

## 2018-07-23 NOTE — CARE COORDINATION
CASE MANAGEMENT NOTE:    Admission Date:  7/23/2018 Gregory Cancer Friday is a 76 y.o.  male    Admitted for : Congestive heart failure, unspecified congestive heart failure chronicity, unspecified congestive heart failure type ([facility] [address] [I50.9]    Met with:  Patient    PCP:  Dr. Ira Ndiaye:  Heather Right:  in nursing home             Family Members/Caregivers that pt would like involved in their care:    Yes    If yes, list name here:  Kaia Wilder    Transportation Provider:  Family                      Discharge Plan:  7/23/18 BCBS Medicare Pt is from a ecf OPPV plan is to return back YVETTE is started and needs signed will continue to follow for needs . //tv                 Electronically signed by:  Lubna Osborne RN on 7/23/2018 at 1:16 PM

## 2018-07-24 NOTE — PROGRESS NOTES
Physical Therapy    Facility/Department: [facility] PROGRESSIVE CARE  Initial Assessment    NAME: Collette Bayard Friday  : 1950  MRN: [id number]    Date of Service: 2018    Discharge Recommendations:  [address]        Patient Diagnosis(es): The primary encounter diagnosis was Acute congestive heart failure, unspecified congestive heart failure type ([facility] [address] Diagnoses of Hyperkalemia, Hypernatremia, Elevated BUN, Elevated serum creatinine, Bilateral pleural effusion, Chronic anemia, and Pulmonary vascular congestion were also pertinent to this visit. has a past medical history of MS (multiple sclerosis) ([facility] [address] has a past surgical history that includes Bypass Graft; pacemaker placement; and Coronary angioplasty with stent. Restrictions  Restrictions/Precautions  Restrictions/Precautions: Fall Risk  Implants present? : Pacemaker (Defibrillator.)  Position Activity Restriction  Other position/activity restrictions: Hx  of MS, PVD, has a azar. Vision/Hearing  Vision: Impaired (Blurry vision, new per pt, Nurse Nataly notified. )  Vision Exceptions: Wears glasses for reading  Hearing: Within functional limits     Subjective  General  Patient assessed for rehabilitation services?: Yes  Additional Pertinent Hx: Pt admitted with SOB progressively worsening  over several days. He also describes orthopnea and significant lower extremity swelling with chronic lower extremity ulcerwhich have been draining.   Referral Date : 18  Diagnosis: CHF  Pain Screening  Patient Currently in Pain: Denies  Vital Signs  Patient Currently in Pain: Denies  Oxygen Therapy  O2 Device: Nasal cannula  O2 Flow Rate (L/min): 2 L/min       Orientation       Social/Functional History  Social/Functional History  Lives With: Other (comment) (Independent apartments at [facility])  Type of Home: Apartment  Home Access: Level entry  Bathroom Shower/Tub: Walk-in shower, Curtain  Bathroom Toilet: Handicap height  Bathroom Equipment: Grab bars in shower, Built-in shower seat, Grab bars around toilet, Hand-held shower  Bathroom Accessibility: Walker accessible  Home Equipment: Rolling walker, [address] Help From: Personal care attendant  ADL Assistance: Needs assistance  Homemaking Assistance:  (HHA- shower, washing back/feet, can dress on his own)  Ambulation Assistance: Independent (RW within Apt, W/c in the hallway to dinning area.)  Transfer Assistance: Independent ([address] [place] [address])  Active : No  Patient's  Info: Loyda Monreal- w/c accessible, facility provides rides  Additional Comments: Has assistance for laundry, cleaning, goes to dinning are for all 3 meals. Song Nickie handle his meds in [facility] facility. Objective          PROM RLE (degrees)  RLE General PROM: Hip/Knee WFL, ankle DF to neutral. Tone felt in R LE. R LE has dressing with ace wrap, per pt he has open wounds on R LE, LE swelling noted. AROM LLE (degrees)  LLE AROM : WFL  AROM RUE (degrees)  RUE AROM : WFL  AROM LUE (degrees)  LUE AROM : WFL  Strength RLE  Comment: Unable to perform MMT on R LE, Hypotonic  Strength LLE  Comment: 4-/5     Sensation  Overall Sensation Status: Impaired  Additional Comments: Numbness R LE  Bed mobility  Rolling to Left: Moderate assistance  Rolling to Right: Moderate assistance  Supine to Sit: Moderate assistance  Sit to Supine: Moderate assistance (x2, assit needed for trunk as well as B LE into bed. )  Scooting: Moderate assistance;Maximal assistance (At EOB. )  Transfers  Sit to Stand: Moderate Assistance (Pt fearfull of trying to attempt to stand. )  Stand to sit: Minimal Assistance  Bed to Chair: Unable to assess  Comment: Stood with walker for 1 minute, 15 secs, while PCA changed bedding and put a air mattress on the bed, Pt unable to side step to heas of bed, weak B LE, R LE buckled as pt tried to move L LE, pt got fearfull and wont try again. Max A to maintain balance when R LE  buckled.    Ambulation  Ambulation?: No

## 2018-07-24 NOTE — PROGRESS NOTES
Progress Note    Patient Name:  Clifford Cole Friday    :  1950 4:58 PM      SUBJECTIVE       Mr. Friday  has no chest pain, shortness of breath, palpitations, nausea or vomiting      OBJECTIVE     Vital signs:    BP (!) 120/58   Pulse 65   Temp 97.6 °F (36.4 °C) (Oral)   Resp 16   Ht 5' 9\" (1.753 m)   Wt 174 lb (78.9 kg)   SpO2 97%   BMI 25.70 kg/m²  2 L/min      Admit Weight:  174 lb (78.9 kg)    Last 3 weights: Wt Readings from Last 3 Encounters:   18 174 lb (78.9 kg)   18 175 lb (79.4 kg)   17 173 lb (78.5 kg)       BMI: Body mass index is 25.7 kg/m².     Input/Output:       Intake/Output Summary (Last 24 hours) at 18 1658  Last data filed at 18 1630   Gross per 24 hour   Intake              730 ml   Output             3775 ml   Net            -3045 ml         Date 18 0000 - 18 2359   Shift 0998-3494 9860-5467 5369-6147 24 Hour Total   I  N  T  A  K  E   P.O.  (mL/kg/hr)  240  (0.4)  240    I.V.  (mL/kg)  10  (0.1)  10  (0.1)    Shift Total  (mL/kg)  250  (3.2)  250  (3.2)   O  U  T  P  U  T   Urine  (mL/kg/hr) 1675  (2.7) 1350  (2.1) 750 3775    Shift Total  (mL/kg) 1675  (21.2) 1350  (17.1) 750  (9.5) 3775  (47.8)   Weight (kg) 78.9 78.9 78.9 78.9     Exam:     General appearance: awake and alert moves all ext   Lungs: no rhonchi, no wheezes, no rales  Heart: S1 and S2 no murmur  Abdomen: positive bowel sounds, no bruits, no masses  Extremities: warm and dry, no cyanosis, no clubbing        Laboratory Studies:     CBC: Recent Labs      18   0451  18   0519   WBC  5.1  6.1   HGB  9.7*  9.8*   HCT  30.6*  29.9*   MCV  100.3*  101.4*   PLT  67*  70*     BMP: Recent Labs      18   0451  18   1145  18   0519  18   1559   NA  148*   --   150*  147*   K  5.4*  5.2  4.9   --    CL  117*   --   115*   --    CO2  20   --   22   --    BUN  54*   --   59*   --    CREATININE  1.75*   --   1.99*   --      PT/INR: No

## 2018-07-24 NOTE — PLAN OF CARE
Problem: Falls - Risk of:  Goal: Will remain free from falls  Will remain free from falls   Outcome: Ongoing  No falls during this shift. Call light within reach, wheels locked, bed in lowest position, side rails 2/4    Problem: FLUID AND ELECTROLYTE IMBALANCE  Goal: Patient has stable vital signs and fluid balance  Outcome: Ongoing  Patient's vitals WNL, diuretics being given and edematous limb wrapped to help with swelling    Problem: Risk for Impaired Skin Integrity  Goal: Tissue integrity - skin and mucous membranes  Structural intactness and normal physiological function of skin and  mucous membranes. Outcome: Ongoing  No new skin issues this shift.  Patient kept clean and dry

## 2018-07-24 NOTE — PROGRESS NOTES
Dr. Preet Duncan rounded, discussed need for therapy for skilled placement, ok to go off unit without telemetry.

## 2018-07-24 NOTE — PROGRESS NOTES
Occupational [address]  Occupational Therapy Not Seen Note    Patient not available for Occupational Therapy due to:    [] Testing:    [] Hemodialysis    [] Cancelled by RN:    []Refusal by Patient:    [] Surgery:     [] Intubation:     [] Pain Medication:    [] Sedation:     [] Spine Precautions :    [] Medical Instability:    [x] Other: patient unavailable-w/nrsg care.

## 2018-07-24 NOTE — PROGRESS NOTES
Repeat accucheck 78. Pt states his \"vision is better\" and \"feels better\". Pt drinking vanilla ensure, no acute distress noted. Dr galvez notified of above, no new orders at this time. Continue to monitor.

## 2018-07-24 NOTE — PROGRESS NOTES
Nephrology Progress Note    Subjective/   76y.o. year old male who we are seeing in consultation for DANIEL. This is a 76 y.o. male with history  of Type 2 DM recent hbaic of 6.3, BPH on flomax, CAD s/p CABG, hx of PAD,ischemic cardiomyopathy,  admitted with SOB progressively worsening  over several days. He also describes orthopnea and significant lower extremity swelling with chronic lower extremity ulcerwhich have been draining. He has extensive history of peripheral vascular disease, and carotid artery disease. Unclear if he has had any significant renal artery stenosis in the past.He presented with hyperkalemia of 5.4 with sodium of 148. Was also observed to be hypothermic temperature 92.5 but denied any fevers chills nausea vomiting diarrhea pleuritic chest pain. He denies any weight gain. He does have occasional weak stream but denies urgency, hesitancy acute retention, gross hematuria flank pain or recurrent UTIs or kidney stones appear.   His potassium was 5.4 the ER and he received IV Lasix 40 mg daily with good diuresis  His baseline creatinine ranges in the 1.3 mg/dl range.     Interval history:  Some improvement in edema and sob  Creat up to 1.99 mg/dl and sodium 150.-started on D5/water.       Objective/     Vitals:    07/23/18 1854 07/24/18 0032 07/24/18 0608 07/24/18 1223   BP: (!) 117/54 (!) 114/49 131/61 (!) 120/58   Pulse: 59 60 64 65   Resp: 16 16 16 16   Temp: 97.4 °F (36.3 °C) 97.3 °F (36.3 °C) 98.2 °F (36.8 °C) 97.6 °F (36.4 °C)   TempSrc: Oral Oral Oral Oral   SpO2: 98% 97% 98% 97%   Weight:       Height:         24HR INTAKE/OUTPUT:    Intake/Output Summary (Last 24 hours) at 07/24/18 1245  Last data filed at 07/24/18 1130   Gross per 24 hour   Intake             1210 ml   Output             4354 ml   Net            -3144 ml     Patient Vitals for the past 96 hrs (Last 3 readings):   Weight   07/23/18 0426 174 lb (78.9 kg)       Constitutional:  Alert, awake, no apparent distress  Cardiovascular:  S1, S2 without m/r/g  Respiratory:  CTA B without w/r/r  Abdomen: +bs, soft, nt  Ext: 2+ LE edema    Data/  Recent Labs      07/23/18   0451  07/24/18   0519   WBC  5.1  6.1   HGB  9.7*  9.8*   HCT  30.6*  29.9*   MCV  100.3*  101.4*   PLT  67*  70*     Recent Labs      07/23/18   0451  07/23/18   1145  07/24/18   0519   NA  148*   --   150*   K  5.4*  5.2  4.9   CL  117*   --   115*   CO2  20   --   22   GLUCOSE  137*   --   51*   BUN  54*   --   59*   CREATININE  1.75*   --   1.99*   LABGLOM  39*   --   34*   GFRAA  47*   --   41*         Assessment/   1. Acute kidney injury 2nd to type 1 cardiorenal syndrome, possibility of urinary retention is considered with post void bladder scan of > 560 cc.     2. Acute on chronic decompressed systolic heart failure     3. Acute hyperkalemia 2nd to low urine flow.     4. Type 2 diabetes melltius     5. Ischemic cardiomyopathy s/p AICD. 6.LE edema  Bilaterally. Plan/   1. Cont  IV lasix to 40 mg bid-recheck serum sodium  2. Strict I/Os  3.keep  azar cath due to post void bladder retention  4. Comprehensive urine testing including Urinalysis, Urine sodium, potassium, chloride, Urine protein and creatinine to quantify the proteinuria if present. Will check urinary eosinophils. 5. follow serologies  6Urine protein/creatinine ratio  7. Hold ACE-I or ARB due to renal failure-lisinopril can be resumed once renal function stabilizes.   8-monitor electrolytes , BUN/creatinine    Fabiola Espinoza

## 2018-07-24 NOTE — PROGRESS NOTES
auscultation bilaterally  Heart: regular rate and rhythm, S1, S2 normal, no murmur, click, rub or gallop  Abdomen: soft, non-tender; bowel sounds normal; no masses,  no organomegaly  Extremities: 3+ edema  Neurologic: Mental status: Somewhat lethargic difficult to arouse    Assessment and Plan:   1. Congestive heart failureper nephrology and cardiology  2. Acute kidney injuryper nephrology. 3. Diabetes mellitus. Somewhat hypoglycemic. Decrease Glucotrol to 5 mg daily  4. Consult physical therapy and occupational therapy. 5. Await cardiac echo report  6.  macrocytosis. Check L86 and folic acid  7. Elevated liver enzymes. Hepatorenal syndrome? Check liver ultrasound and viral hepatitis panel.     Patient Active Problem List:     Abnormality of gait     Essential hypertension     Benign prostatic hyperplasia     Carotid atherosclerosis     Cataract     Coronary arteriosclerosis     Diabetes mellitus ([address]     Glaucoma     HTN (hypertension)     Hyperlipidemia     Male erectile disorder     Multiple sclerosis (HCC)     Vitamin B12 deficiency     Vitamin D deficiency     Dementia associated with other underlying disease     Anxiety     Localized edema     Environmental allergies     Congestive heart failure ([address]     DANIEL (acute kidney injury) ([address]     Elevated liver enzymes      Electronically signed by Roberto Ray MD on 7/24/2018 at 8:26 AM

## 2018-07-25 PROBLEM — K80.20 CALCULUS OF GALLBLADDER: Status: ACTIVE | Noted: 2018-01-01

## 2018-07-25 NOTE — PROGRESS NOTES
Progress Note    7/25/2018 8:13 AM  Subjective: Interval History: Patient complaining of severe nasal congestion. States he doesn't believe that he will be able to eat. Denies any chest pain. No shortness of breath. Patient did have some hypoglycemia yesterday. Glucotrol was decreased to 5 mg daily. Patient denies any nausea or vomiting. No diarrhea. Diet: DIET CARDIAC; Daily Fluid Restriction: 1500 ml    Medications:   Reviewed medications    Labs:   CBC:   Recent Labs      07/25/18   0521   WBC  7.8   HGB  10.1*   PLT  81*     BMP:    Recent Labs      07/24/18 0519 07/24/18   1559   NA  150*  147*   K  4.9   --    CL  115*   --    CO2  22   --    BUN  59*   --    CREATININE  1.99*   --    GLUCOSE  51*   --      Hepatic:   Recent Labs      07/24/18 0519   AST  97*   ALT  101*   BILITOT  0.29*   ALKPHOS  126     Troponin: No results for input(s): TROPONINI in the last 72 hours. BNP: No results for input(s): BNP in the last 72 hours. Lipids: No results for input(s): CHOL, HDL in the last 72 hours. Invalid input(s): LDLCALCU  INR: No results for input(s): INR in the last 72 hours. Objective:   Vitals: BP (!) 131/53   Pulse 64   Temp 97.9 °F (36.6 °C) (Oral)   Resp 16   Ht 5' 9\" (1.753 m)   Wt 188 lb 0.8 oz (85.3 kg)   SpO2 95%   BMI 27.77 kg/m²   General appearance: alert and cooperative with exam  Neck: no adenopathy, no carotid bruit, no JVD, supple, symmetrical, trachea midline and thyroid not enlarged, symmetric, no tenderness/mass/nodules  Lungs: clear to auscultation bilaterally  Heart: regular rate and rhythm, S1, S2 normal, no murmur, click, rub or gallop  Abdomen: soft, non-tender; bowel sounds normal; no masses,  no organomegaly  Extremities: 2+ edema  Neurologic: Mental status: Alert, oriented, thought content appropriate    Assessment and Plan:   1. Acute on chronic congestive heart failure. Per cardiology and nephrology. 2. Acute kidney injury. Per nephrology.   Check BMP today. 3. Diabetes mellitus. Had hypoglycemia but better this morning. Continue to monitor. 4. Elevated liver enzymes. We'll repeat today. If continued elevated may require HIDA scan. Gallbladder ultrasound and viral hepatitis panel were negative. 5. Nasal congestion. Afrin nasal spray as needed. 6. Discharge plans are for skilled nursing facility.     Patient Active Problem List:     Abnormality of gait     Essential hypertension     Benign prostatic hyperplasia     Carotid atherosclerosis     Cataract     Coronary arteriosclerosis     Diabetes mellitus ([address]     Glaucoma     HTN (hypertension)     Hyperlipidemia     Male erectile disorder     Multiple sclerosis (HCC)     Vitamin B12 deficiency     Vitamin D deficiency     Dementia associated with other underlying disease     Anxiety     Localized edema     Environmental allergies     Congestive heart failure ([address]     DANIEL (acute kidney injury) ([address]     Elevated liver enzymes      Electronically signed by Gordon Rowan MD on 7/25/2018 at 8:13 AM

## 2018-07-25 NOTE — PROGRESS NOTES
Progress Note    Patient Name:  Teresa Aden Friday    :  1950 2:37 PM      SUBJECTIVE       Mr. Friday  has no chest pain, shortness of breath, palpitations, nausea or vomiting      OBJECTIVE     Vital signs:    BP (!) 120/46   Pulse 67   Temp 97.4 °F (36.3 °C) (Axillary)   Resp 16   Ht 5' 9\" (1.753 m)   Wt 188 lb 0.8 oz (85.3 kg)   SpO2 96%   BMI 27.77 kg/m²  2 L/min      Admit Weight:  174 lb (78.9 kg)    Last 3 weights: Wt Readings from Last 3 Encounters:   18 188 lb 0.8 oz (85.3 kg)   18 175 lb (79.4 kg)   17 173 lb (78.5 kg)       BMI: Body mass index is 27.77 kg/m².     Input/Output:       Intake/Output Summary (Last 24 hours) at 18 1437  Last data filed at 18 1704   Gross per 24 hour   Intake          1305.83 ml   Output             3000 ml   Net         -1694.17 ml         Date 18 0000 - 18 2359   Shift 3655-0696 8521-5589 9988-0268 24 Hour Total   I  N  T  A  K  E   I.V.  (mL/kg) 627.5  (7.4)   627.5  (7.4)    Shift Total  (mL/kg) 627.5  (7.4)   627.5  (7.4)   O  U  T  P  U  T   Urine  (mL/kg/hr) 1800  (2.6)   1800    Shift Total  (mL/kg) 1800  (21.1)   1800  (21.1)   Weight (kg) 85.3 85.3 85.3 85.3     Exam:     General appearance: awake and alert moves all ext   Lungs: no rhonchi, no wheezes, no rales  Heart: S1 and S2 no murmur  Abdomen: positive bowel sounds, no bruits, no masses  Extremities: warm and dry, no cyanosis, no clubbing        Laboratory Studies:     CBC:   Recent Labs      18   0451  18   0519  18   0521   WBC  5.1  6.1  7.8   HGB  9.7*  9.8*  10.1*   HCT  30.6*  29.9*  30.7*   MCV  100.3*  101.4*  98.9   PLT  67*  70*  81*     BMP:   Recent Labs      18   0451  18   1145  18   0519  18   1559  18   0521   NA  148*   --   150*  147*  142   K  5.4*  5.2  4.9   --   4.5   CL  117*   --   115*   --   104   CO2  20   --   22   --   24   BUN  54*   --   59*   --   63*   CREATININE 1.75*   --   1.99*   --   2.15*     PT/INR: No results for input(s): PROTIME, INR in the last 72 hours. APTT: No results for input(s): APTT in the last 72 hours. MAG: No results for input(s): MG in the last 72 hours. D Dimer: No results for input(s): DDIMER in the last 72 hours. Troponin  No results for input(s): TROPONINI in the last 72 hours. Recent Labs      18   0451  18   0649   TROPONINT  <0.03  <0.03      BNP No results for input(s): BNP in the last 72 hours. Recent Labs      1849   PROBNP  2,593*         Pulse Ox: SpO2  Av.8 %  Min: 95 %  Max: 99 %  Supplemental O2: O2 Flow Rate (L/min): 2 L/min     Current Meds:    [START ON 2018] enoxaparin  40 mg Subcutaneous Daily    [START ON 2018] furosemide  40 mg Oral Daily    glipiZIDE  5 mg Oral Daily    sodium chloride flush  10 mL Intravenous 2 times per day    amLODIPine  5 mg Oral Daily    atenolol  50 mg Oral Daily    atorvastatin  80 mg Oral Daily    baclofen  10 mg Oral TID    vitamin D  2,000 Units Oral Daily    cyanocobalamin  1,000 mcg Oral Daily    hydrALAZINE  100 mg Oral BID    cetirizine  5 mg Oral Daily    sertraline  25 mg Oral Daily    miconazole   Topical BID    insulin lispro  0-12 Units Subcutaneous TID WC    insulin lispro  0-6 Units Subcutaneous Nightly    aspirin  81 mg Oral Daily    interferon beta-1a  44 mcg Subcutaneous Once per day on     Travoprost (KAY Free)  1 drop Both Eyes Nightly    Azelastine-Fluticasone  1 spray Each Nare BID     Continuous Infusions:    dextrose 50 mL/hr at 18 0904    dextrose              ASSESSMENT     Principal Problem:    Congestive heart failure (HCC)  Active Problems:    Diabetes mellitus (HCC)    HTN (hypertension)    Localized edema    DANIEL (acute kidney injury) (HCC)    Elevated liver enzymes    Calculus of gallbladder  Resolved Problems:    * No resolved hospital problems.  *      PLAN     Acute on chronic heart failure with preserved EF  55 60%, moderate tricuspid and mitral regurgitation continue Lasix 40 mg p.o. once a day    Coronary disease previous CABG denies any chest pain history of ischemic heart failure status post ICD. Continue atenolol, high-intensity statin, and aspirin. Hypertension controlled continue current medications. Patient stable from cardiac perspective no further testing needed. Can be discharged follow-up with our office in 1 week as well as referral to Heart failure Clinic. Will sign off please call for any questions or concern.     Electronically signed by Dale Graham DO on 7/25/2018 at 2:37 PM

## 2018-07-26 NOTE — PROGRESS NOTES
[address] [facility]    Occupational Therapy Evaluation  Date: 18  Patient Name: Nicholas Medley Friday       Room:   MRN: [id number]  Account: [de-identified]   : 1950  (77 y.o.) Gender: male     Discharge Recommendations:  [address]    Referring Practitioner: Dr. Amilcar Henriquez  Diagnosis: CHF    Treatment Diagnosis: Impaired self-care status  Past Medical History:  has a past medical history of MS (multiple sclerosis) ([facility] Past Surgical History:   has a past surgical history that includes Bypass Graft; pacemaker placement; and Coronary angioplasty with stent. Restrictions  Restrictions/Precautions: Fall Risk  Implants present? : Pacemaker (Defibrillator.)  Other position/activity restrictions: Hx  of MS, PVD, has a azar. Required Braces or Orthoses?: No     Vitals  Temp: 97.9 °F (36.6 °C)  Pulse: 55  Resp: 13  BP: (!) 126/57  Height: 5' 9\" (175.3 cm)  Weight: 187 lb 2.7 oz (84.9 kg)  BMI (Calculated): 27.7  Oxygen Therapy  SpO2: 92 %  Pulse Oximeter Device Mode: Continuous  O2 Device: None (Room air)  O2 Flow Rate (L/min): 2 L/min  Level of Consciousness: Alert    Subjective  Subjective: \"I had breakfast at the doctor's office this morning\"  Comments: Confirmed with RN that pt was not out to an MD's office this morning.   Overall Orientation Status: Within Functional Limits (However repeatedly refers to being at MD office this morning)  Vision  Vision: Impaired (Blurry vision, new per pt, Nurse Nataly notified. )  Vision Exceptions: Wears glasses for reading  Hearing  Hearing: Within functional limits  Social/Functional History  Lives With: Other (comment) (Independent apartments at [employer])  Type of Home: Apartment  Home Access: Level entry  Bathroom Shower/Tub: Walk-in shower, Curtain  Bathroom Toilet: Handicap height  Bathroom Equipment: Grab bars in shower, Built-in shower seat, Grab bars around toilet, Hand-held shower  Bathroom Accessibility: Sherrine Specking accessible  Home Equipment: Rolling walker, Wheelchair-manual  Receives Help From: Personal care attendant  ADL Assistance: Needs assistance  Homemaking Assistance:  (HHA- shower, washing back/feet, can dress on his own)  Ambulation Assistance: Independent (RW within Apt, W/c in the hallway to dinning area.)  Transfer Assistance: Independent (RW)  Active : No  Patient's  Info: Eneida Lozada- w/c accessible, facility provides rides  Additional Comments: Has assistance for laundry, cleaning, goes to dinning are for all 3 meals. Manuela Ham handle his meds in [facility] facility. Objective  Vision - Basic Assessment  Patient Visual Report: No visual complaint reported. Perception  Overall Perceptual Status: WFL  Sensation  Overall Sensation Status: Impaired  Additional Comments: Numbness R LE/R UE   ADL  Feeding: Modified independent , Increased time to complete, Setup (Reports difficulty 2* impaired RUE (dominant hand))  Grooming: Minimal assistance  UE Bathing: Minimal assistance, Moderate assistance  LE Bathing: Moderate assistance, Maximum assistance  UE Dressing: Moderate assistance, Maximum assistance  LE Dressing: Maximum assistance, Dependent/Total  Toileting: Dependent/Total    UE Function  Hand Dominance  Hand Dominance: Right     LUE Strength  Gross LUE Strength: WFL  L Hand Grasp: 4+/5     LUE AROM (degrees)  LUE AROM : WFL  LUE General AROM: Some limitations in shoulder but WFL for self-care     Left Hand AROM (degrees)  Left Hand AROM: WFL  RUE Strength  Gross RUE Strength: Exceptions to [facility]  R Hand Grasp: 3-/5  RUE Strength Comment: Shoulder 3/5; Elbow WFL      RUE AROM (degrees)  RUE AROM : Exceptions  RUE General AROM: Active shoulder movement <50% (limited passively as well);  Elbow WFL     Right Hand AROM (degrees)  Right Hand AROM: WFL  Right Hand General AROM: Increased time to complete full fist    Fine Motor Skills  Coordination  Movements Are Fluid And Coordinated: No  Coordination and Movement

## 2018-07-26 NOTE — PROGRESS NOTES
Physical Therapy  [address]   Physical Therapy Progress Note    Date: 18  Patient Name: Ángel Mnaley Friday       Room:   MRN: [id number]   Account: [de-identified]   : 1950  (77 y.o.)   Gender: male     Discharge Recommendations   [address]     Referring Practitioner: Dr. SORIANO BERTHA [facility]  Diagnosis: CHF  Restrictions/Precautions: Fall Risk  Implants present? : Pacemaker (Defibrillator.)  Other position/activity restrictions: Hx  of MS, PVD, has a azar. Past Medical History:  has a past medical history of MS (multiple sclerosis) ([address] Past Surgical History:   has a past surgical history that includes Bypass Graft; pacemaker placement; and Coronary angioplasty with stent. Additional Pertinent Hx: Pt admitted with SOB progressively worsening  over several days. He also describes orthopnea and significant lower extremity swelling with chronic lower extremity ulcerwhich have been draining. Overall Orientation Status: Impaired  Orientation Level: Oriented to place, Oriented to time, Oriented to person, Disoriented to situation (Answers orientation questions appropriately; confused about recent activity)    Subjective: Pt somewhat reluctant to participate in PT/OT cotx at this time; agreeable to attempt [facility] transfer. Answers orientation questions correctly, but seems confused (had breakfast at his dr's office this a.m., asked for lunch directly after breakfast). Comments: Blood glucose reading 45 during tx; ther ex halted. EMETERIO Sommers tx before blood sugar reading, aware of position, alarm, CHAUNCEY for transfers. Cotx with OT Antelmo Ordonez. Vital Signs  Patient Currently in Pain: Denies     Oxygen Therapy  O2 Device: None (Room air)     Patient Observation  Observations: Azar, IV, EPCs; supine in bed on entry, in bedside chair on exit, table in front, pull-away alarm on, remote within reach.       Bed Mobility  Bridging: Stand by assistance (lifts hips to ease

## 2018-07-26 NOTE — PROGRESS NOTES
Nephrology Progress Note    Subjective/   76y.o. year old male who we are seeing in consultation for DANIEL. This is a 76 y.o. male with history  of Type 2 DM recent hbaic of 6.3, BPH on flomax, CAD s/p CABG, hx of PAD,ischemic cardiomyopathy,  admitted with SOB progressively worsening  over several days. He also describes orthopnea and significant lower extremity swelling with chronic lower extremity ulcerwhich have been draining. He has extensive history of peripheral vascular disease, and carotid artery disease. Unclear if he has had any significant renal artery stenosis in the past.He presented with hyperkalemia of 5.4 with sodium of 148. Was also observed to be hypothermic temperature 92.5 but denied any fevers chills nausea vomiting diarrhea pleuritic chest pain. He denies any weight gain. He does have occasional weak stream but denies urgency, hesitancy acute retention, gross hematuria flank pain or recurrent UTIs or kidney stones appear. His potassium was 5.4 the ER and he received IV Lasix 40 mg daily with good diuresis  His baseline creatinine ranges in the 1.3 mg/dl range.     Interval history:  Hypoglycemic today in 40s-He was on Glucotrol  Some improvement in edema and sob- creatinine stable at 2.05  mg/dl  Chest x-ray shows resolved previously present pulmonary vascular congestion.     Objective/     Vitals:    07/25/18 2145 07/26/18 0020 07/26/18 0600 07/26/18 0658   BP: 135/60 (!) 126/47  (!) 126/57   Pulse: 62 66  55   Resp:  16  13   Temp:  98.1 °F (36.7 °C)  97.9 °F (36.6 °C)   TempSrc:  Oral  Oral   SpO2:  94%  92%   Weight:   187 lb 2.7 oz (84.9 kg)    Height:         24HR INTAKE/OUTPUT:      Intake/Output Summary (Last 24 hours) at 07/26/18 1144  Last data filed at 07/26/18 4756   Gross per 24 hour   Intake          1620.67 ml   Output             1950 ml   Net          -329.33 ml     Patient Vitals for the past 96 hrs (Last 3 readings):   Weight   07/26/18 0600 187 lb 2.7 oz (84.9 kg) 07/25/18 0645 188 lb 0.8 oz (85.3 kg)   07/23/18 0426 174 lb (78.9 kg)       Constitutional:  Alert, awake, no apparent distress  Cardiovascular:  S1, S2 without m/r/g  Respiratory:  CTA B without w/r/r  Abdomen: +bs, soft, nt  Ext: 2+ LE edema    Data/  Recent Labs      07/24/18   0519  07/25/18   0521  07/26/18   0513   WBC  6.1  7.8  7.4   HGB  9.8*  10.1*  10.3*   HCT  29.9*  30.7*  31.5*   MCV  101.4*  98.9  98.3   PLT  70*  81*  83*     Recent Labs      07/24/18   0519  07/24/18   1559  07/25/18   0521  07/26/18   0513   NA  150*  147*  142  139   K  4.9   --   4.5  4.3   CL  115*   --   104  101   CO2  22   --   24  25   GLUCOSE  51*   --   89  137*   BUN  59*   --   63*  62*   CREATININE  1.99*   --   2.15*  2.05*   LABGLOM  34*   --   31*  32*   GFRAA  41*   --   37*  39*         Assessment/   1. Acute kidney injury 2nd to type 1 cardiorenal syndrome, possibility of urinary retention is considered with post void bladder scan of > 560 cc.     2. Acute on chronic decompressed systolic heart failure-improving -approx 4 kg off since admission by I/Os     3. Acute hyperkalemia 2nd to low urine flow.-resolved     4. Type 2 diabetes melltius     5. Ischemic cardiomyopathy s/p AICD. 6.LE edema  Bilaterally-improving      Plan/   Continue Lasix 40 mg p.o. Daily  Trial of void tomorrow. Remove Mantilla catheter tomorrow. Continue Flomax 0.4 mg daily  Hold ACE-I or ARB due to renal failure-lisinopril can be resumed once renal function stabilizes. monitor electrolytes , BUN/creatinine      Okay for discharge to SNF from renal standpoint.     Mila Conde M.D, [facility]  Nephrologist

## 2018-07-26 NOTE — CONSULTS
Inpatient consult to GI  Consult performed by: Morris Braun ordered by: Rosalba Her           GI Consult Note:    Name: Clifford Cole Friday  MRN: [id number]     Acct: [de-identified]  Room: 2097/2097-01    Admit Date: 7/23/2018  PCP: Julia Boothe MD    Physician Requesting Consult: Julia Boothe MD     Reason for Consult:  Elevated LFTs  Gall stones  Mild anemia     Chief Complaint:     Chief Complaint   Patient presents with    Shortness of Breath       History Obtained From:     Patient and EMR    History of Present Illness:      Clifford Cole Friday is a  76 y.o.  male who presents with Shortness of Breath    This patient has hx of multiple ch med issues  Has been admitted with above  Has CHF  Has been found to have elevation of the LFTs which are improving now  US has shown gall stones  Pt is clinically feeling well GI wise  Has No significant abdominal pains, bloating or cramping  Has no sig GERD symptoms  Has no Dysphagia, No Nausea or any vomiting  Denies any rectal bleeding or any melena  Denies any sig constipation or any diarrhea symptoms  Weight is stable and appetite is generally good. Symptoms:  Onset:  Location:  abdomen  Duration:  day(s)  Severity:  mild  Quality:  stable      Past Medical History:     Past Medical History:   Diagnosis Date    MS (multiple sclerosis) ([facility] [address]         Past Surgical History:     Past Surgical History:   Procedure Laterality Date    BYPASS GRAFT      using vein    CORONARY ANGIOPLASTY WITH STENT PLACEMENT      PACEMAKER PLACEMENT          Medications Prior to Admission:       Prior to Admission medications    Medication Sig Start Date End Date Taking?  Authorizing Provider   Azelastine-Fluticasone (DYMISTA) 137-50 MCG/ACT SUSP 2 actuation by Nasal route 2 times daily  Patient taking differently: 1 spray by Each Nare route 2 times daily  7/18/18  Yes ALEXANDRA Romero - CNP   Travoprost, KAY Free, (TRAVATAN Z) 0.004 % SOLN ophthalmic (LIPITOR) 80 MG tablet Take 80 mg by mouth daily    Robbert Mohs, MD   baclofen (LIORESAL) 10 MG tablet Take 10 mg by mouth 3 times daily    Robbert Mohs, MD   hydrALAZINE (APRESOLINE) 100 MG tablet Take 100 mg by mouth 2 times daily    Robbert Mohs, MD   lisinopril-hydrochlorothiazide GLYNN [facility]) 20-25 MG per tablet Take 1 tablet by mouth daily    Robbert Mohs, MD        Allergies:       Alphagan [brimonidine]    Social History:     Tobacco:    reports that he has never smoked. He has never used smokeless tobacco.  Alcohol:      reports that he does not drink alcohol. Drug Use:  reports that he does not use drugs.     Family History:     Family History   Problem Relation Age of Onset    Alzheimer's Disease Mother     Cancer Father         throat       Review of Systems:     Positive and Negative as described in HPI    Constitutional:  negative for  fevers, chills, sweats, + fatigue, and weight loss  HEENT:  negative for vision or hearing changes,   Respiratory:  + shortness of breath, cough, or congestion  Cardiovascular:  negative for  chest pain, palpitations  Gastrointestinal:  negative for nausea, vomiting, diarrhea, constipation, mild abdominal pain  Genitourinary:  negative for frequency, dysuria  Integument:  negative for rash, skin lesions  Musculoskeletal:  +  muscle aches or joint pain  Neurological:  negative for headaches, dizziness, lightheadedness, numbness, pain and tingling extrimities  Behavior/Psych:  negative for depression and anxiety    Code Status:  Full Code    Physical Exam:     Vitals:  BP (!) 104/50   Pulse 66   Temp 97.9 °F (36.6 °C) (Oral)   Resp 16   Ht 5' 9\" (1.753 m)   Wt 187 lb 2.7 oz (84.9 kg)   SpO2 97%   BMI 27.64 kg/m²   Temp (24hrs), Av °F (36.7 °C), Min:97.9 °F (36.6 °C), Max:98.1 °F (36.7 °C)      General appearance - alert, well appearing, and in no acute distress  Mental status - oriented to person, place, and time with normal

## 2018-07-26 NOTE — PLAN OF CARE
Problem: Falls - Risk of:  Goal: Will remain free from falls  Will remain free from falls   Outcome: Ongoing  Patient remained free from falls. Call light within reach. Problem: Risk for Impaired Skin Integrity  Goal: Tissue integrity - skin and mucous membranes  Structural intactness and normal physiological function of skin and  mucous membranes. Outcome: Ongoing  No new alterations in skin integrity.

## 2018-07-26 NOTE — PLAN OF CARE
Problem: Falls - Risk of:  Goal: Will remain free from falls  Will remain free from falls   Outcome: Met This Shift  The patient remained free from falls this shift, call light within reach, bed in locked and lowest position. Side rails up x2. Continue to monitor closely. Problem: Risk for Impaired Skin Integrity  Goal: Tissue integrity - skin and mucous membranes  Structural intactness and normal physiological function of skin and  mucous membranes.    Outcome: Met This Shift  Pt has leg wound and elbow wound

## 2018-07-27 NOTE — PLAN OF CARE
Problem: Falls - Risk of:  Goal: Will remain free from falls  Will remain free from falls   Outcome: Met This Shift  Pt remains free from falls this shift  Bed locked in lowest position  Call light in reach; pt uses appropriately  Fall risk assessment completed and applied    Problem: Risk for Impaired Skin Integrity  Goal: Tissue integrity - skin and mucous membranes  Structural intactness and normal physiological function of skin and  mucous membranes.    Outcome: Met This Shift  Skin integrity checked and maintained  No new breakdown noted  Skin kept clean and dry  Moisture barrier and lotion applied to skin  Pt turned q2h and prn  Ramon scale completed and applied

## 2018-07-27 NOTE — PROGRESS NOTES
GI Progress notes    7/27/2018   3:46 PM    Name:  Nyla Banda Friday  MRN:    [id number]     Acct:     [de-identified]   Room:  [id number] [place] [address] Day: 4     Admit Date: 7/23/2018  4:25 AM  PCP: Catherine Velez MD    Subjective:     C/C:   Chief Complaint   Patient presents with    Shortness of Breath       Interval History: Status: improved. Doing well GI wise  Has no sig abd pains  Has no nausea or any vomiting  LFTs are improving  No bleeding or any melena  Hep Screen in Negative  Eating well     ROS:  Constitutional: negative for chills, fevers and sweats    Gastrointestinal: negative for abdominal pain, constipation, diarrhea, nausea and vomiting      Medications: Allergies: Allergies   Allergen Reactions    Alphagan [Brimonidine]      Other reaction(s):  Intolerance-unknown       Current Meds:   oxymetazoline (AFRIN) 0.05 % nasal spray 2 spray BID PRN   furosemide (LASIX) tablet 40 mg Daily   tamsulosin (FLOMAX) capsule 0.4 mg Daily   dextrose 5 % solution Continuous   sodium chloride flush 0.9 % injection 10 mL 2 times per day   sodium chloride flush 0.9 % injection 10 mL PRN   acetaminophen (TYLENOL) tablet 650 mg Q4H PRN   amLODIPine (NORVASC) tablet 5 mg Daily   atenolol (TENORMIN) tablet 50 mg Daily   atorvastatin (LIPITOR) tablet 80 mg Daily   baclofen (LIORESAL) tablet 10 mg TID   bisacodyl (DULCOLAX) suppository 10 mg Daily PRN   vitamin D (CHOLECALCIFEROL) tablet 2,000 Units Daily   vitamin B-12 (CYANOCOBALAMIN) tablet 1,000 mcg Daily   hydrALAZINE (APRESOLINE) tablet 100 mg BID   cetirizine (ZYRTEC) tablet 5 mg Daily   sertraline (ZOLOFT) tablet 25 mg Daily   zolpidem (AMBIEN) tablet 5 mg Nightly PRN   miconazole (MICOTIN) 2 % powder BID   glucose (GLUTOSE) 40 % oral gel 15 g PRN   dextrose 50 % solution 12.5 g PRN   glucagon (rDNA) injection 1 mg PRN   dextrose 5 % solution PRN   insulin lispro (HUMALOG) injection vial 0-12 Units TID WC   insulin lispro (HUMALOG) injection vial 0-6 Units Nightly aspirin EC tablet 81 mg Daily   interferon beta-1a (REBIF) injection 44 mcg Once per day on    Travoprost (KAY Free) (TRAVATAN Z) 0.004 % ophthalmic solution SOLN 1 drop Nightly   Azelastine-Fluticasone 137-50 MCG/ACT SUSP 1 spray BID       Data:     Code Status:  Full Code    Family History   Problem Relation Age of Onset    Alzheimer's Disease Mother     Cancer Father         throat       Social History     Social History    Marital status: Legally      Spouse name: N/A    Number of children: N/A    Years of education: N/A     Occupational History    Not on file. Social History Main Topics    Smoking status: Never Smoker    Smokeless tobacco: Never Used    Alcohol use No    Drug use: No    Sexual activity: Not on file     Other Topics Concern    Not on file     Social History Narrative    No narrative on file       Vitals:  BP (!) 116/53   Pulse 64   Temp 97.5 °F (36.4 °C) (Oral)   Resp 16   Ht 5' 9\" (1.753 m)   Wt 187 lb 2.7 oz (84.9 kg)   SpO2 98%   BMI 27.64 kg/m²   Temp (24hrs), Av.5 °F (36.4 °C), Min:97.3 °F (36.3 °C), Max:97.8 °F (36.6 °C)    Recent Labs      188  18   2345  18   0717  18   1046   POCGLU  212*  161*  134*  135*       I/O (24Hr):     Intake/Output Summary (Last 24 hours) at 18 1546  Last data filed at 18 1000   Gross per 24 hour   Intake                0 ml   Output             1800 ml   Net            -1800 ml       Labs:      CBC: Lab Results   Component Value Date    WBC 7.4 2018    RBC 3.14 2018    HGB 10.1 2018    HCT 30.8 2018    MCV 98.0 2018    MCH 32.2 2018    MCHC 32.8 2018    RDW 16.4 2018    PLT 79 2018    MPV 11.5 2018     CBC with Differential:  Lab Results   Component Value Date    WBC 7.4 2018    RBC 3.14 2018    HGB 10.1 2018    HCT 30.8 2018    PLT 79 2018    MCV 98.0 2018    MCH 32.2 07/27/2018    MCHC 32.8 07/27/2018    RDW 16.4 07/27/2018    LYMPHOPCT 12 07/27/2018    LYMPHOPCT 13.6 05/30/2018    MONOPCT 13 07/27/2018    MONOPCT 8.8 05/30/2018    EOSPCT 1.4 05/30/2018    BASOPCT 0 07/27/2018    BASOPCT 0.3 05/30/2018    MONOSABS 0.90 07/27/2018    MONOSABS 0.6 05/30/2018    LYMPHSABS 0.90 07/27/2018    LYMPHSABS 0.9 05/30/2018    EOSABS 0.10 07/27/2018    EOSABS 0.1 05/30/2018    BASOSABS 0.00 07/27/2018    DIFFTYPE NOT REPORTED 07/27/2018     Hemoglobin/Hematocrit:  Lab Results   Component Value Date    HGB 10.1 07/27/2018    HCT 30.8 07/27/2018     CMP:  Lab Results   Component Value Date     07/27/2018    K 3.9 07/27/2018    CL 99 07/27/2018    CO2 24 07/27/2018    BUN 63 07/27/2018    CREATININE 2.03 07/27/2018    GFRAA 40 07/27/2018    LABGLOM 33 07/27/2018    GLUCOSE 167 07/27/2018    PROT 6.1 07/27/2018    LABALBU 2.7 07/27/2018    LABALBU 4.1 03/20/2012    CALCIUM 8.5 07/27/2018    BILITOT 0.39 07/27/2018    ALKPHOS 120 07/27/2018    AST 61 07/27/2018    ALT 78 07/27/2018     BMP:  Lab Results   Component Value Date     07/27/2018    K 3.9 07/27/2018    CL 99 07/27/2018    CO2 24 07/27/2018    BUN 63 07/27/2018    LABALBU 2.7 07/27/2018    LABALBU 4.1 03/20/2012    CREATININE 2.03 07/27/2018    CALCIUM 8.5 07/27/2018    GFRAA 40 07/27/2018    LABGLOM 33 07/27/2018    GLUCOSE 167 07/27/2018     PT/INR:    Lab Results   Component Value Date    PROTIME 10.8 07/05/2013    INR 1.0 07/05/2013     PTT:    Lab Results   Component Value Date    APTT 27.4 07/05/2013   [APTT}    Physical Examination:        General appearance: alert, cooperative and no distress  Mental Status: oriented to person, place and time and normal affect    Abdomen: soft, nontender, nondistended, bowel sounds present     Assessment:        Primary Problem  Congestive heart failure [facility])     Active Hospital Problems    Diagnosis Date Noted    Chronic anemia [D64.9]     Elevated BUN [R79.9]     Calculus of gallbladder [K80.20] 07/25/2018    Congestive heart failure ([facility] [I50.9] 07/23/2018    DANIEL (acute kidney injury) ([facility] [N17.9] 07/23/2018    Elevated liver enzymes [R74.8] 07/23/2018    Localized edema [R60.0] 11/21/2017    HTN (hypertension) [I10] 01/29/2016    Diabetes mellitus ([facility] [E11.9] 01/29/2016     Past Medical History:   Diagnosis Date    MS (multiple sclerosis) ([facility]         Plan:        1. Stable GI wise  2. Cont to f/u LFTs as out patient  3. F/u HGB  4. Appears to be related to passive congestion of the liver from CHF/ fatty changes and meds   5. RTC in 2-3 weeks with LFTs  6.  Will signoff     Explained to the patient and d/W Nursing Staff    Please call or Page for any issues or change in status  Thanks    Electronically signed by Lilliana Wharton MD on 7/27/2018 at 3:46 PM

## 2018-07-27 NOTE — PROGRESS NOTES
Progress Note    7/27/2018 8:03 AM  Subjective: Interval History: Pt without complaint. Using right arm better. Plt ct 79. Still with some low blood sugars. Diet: DIET CARDIAC; Daily Fluid Restriction: 1500 ml    Medications:   Reviewed medications    Labs:   CBC:   Recent Labs      07/27/18 0522   WBC  7.4   HGB  10.1*   PLT  79*     BMP:    Recent Labs      07/27/18 0522   NA  137   K  3.9   CL  99   CO2  24   BUN  63*   CREATININE  2.03*   GLUCOSE  167*     Hepatic:   Recent Labs      07/27/18 0522   AST  61*   ALT  78*   BILITOT  0.39   ALKPHOS  120     Troponin: No results for input(s): TROPONINI in the last 72 hours. BNP: No results for input(s): BNP in the last 72 hours. Lipids: No results for input(s): CHOL, HDL in the last 72 hours. Invalid input(s): LDLCALCU  INR: No results for input(s): INR in the last 72 hours.     CBC:   Lab Results   Component Value Date    WBC 7.4 07/27/2018    RBC 3.14 07/27/2018    HGB 10.1 07/27/2018    HCT 30.8 07/27/2018    MCV 98.0 07/27/2018    MCH 32.2 07/27/2018    MCHC 32.8 07/27/2018    RDW 16.4 07/27/2018    PLT 79 07/27/2018    MPV 11.5 07/27/2018     BMP:    Lab Results   Component Value Date     07/27/2018    K 3.9 07/27/2018    CL 99 07/27/2018    CO2 24 07/27/2018    BUN 63 07/27/2018    LABALBU 2.7 07/27/2018    LABALBU 4.1 03/20/2012    CREATININE 2.03 07/27/2018    CALCIUM 8.5 07/27/2018    GFRAA 40 07/27/2018    LABGLOM 33 07/27/2018    GLUCOSE 167 07/27/2018     Hepatic Function Panel:    Lab Results   Component Value Date    ALKPHOS 120 07/27/2018    ALT 78 07/27/2018    AST 61 07/27/2018    PROT 6.1 07/27/2018    BILITOT 0.39 07/27/2018    BILIDIR 0.11 07/27/2018    IBILI 0.28 07/27/2018    LABALBU 2.7 07/27/2018    LABALBU 4.1 03/20/2012       Objective:   Vitals: BP (!) 145/53   Pulse 60   Temp 97.8 °F (36.6 °C) (Oral)   Resp 17   Ht 5' 9\" (1.753 m)   Wt 187 lb 2.7 oz (84.9 kg)   SpO2 98%   BMI 27.64 kg/m²   General appearance: alert and cooperative with exam  Neck: no adenopathy, no carotid bruit, no JVD, supple, symmetrical, trachea midline and thyroid not enlarged, symmetric, no tenderness/mass/nodules  Lungs: clear to auscultation bilaterally  Heart: regular rate and rhythm, S1, S2 normal, no murmur, click, rub or gallop  Abdomen: soft, non-tender; bowel sounds normal; no masses,  no organomegaly  Extremities: extremities normal, atraumatic, no cyanosis or edema  Neurologic: Mental status: Alert, oriented, thought content appropriate    Assessment and Plan:   1. chf- per nephrology  2. Acute on chronic kidney injury- per nephrology  3. NIDDM- dc glucotrol. Will monitor blood sugars at SNF  4. Elevated liver enzymes- improved today. Hepatic congestion? 5. Dc to snf today if ok with other services.      Patient Active Problem List:     Abnormality of gait     Essential hypertension     Benign prostatic hyperplasia     Carotid atherosclerosis     Cataract     Coronary arteriosclerosis     Diabetes mellitus ([facility] [address]     Glaucoma     HTN (hypertension)     Hyperlipidemia     Male erectile disorder     Multiple sclerosis (HCC)     Vitamin B12 deficiency     Vitamin D deficiency     Dementia associated with other underlying disease     Anxiety     Localized edema     Environmental allergies     Congestive heart failure (HCC)     DANIEL (acute kidney injury) ([address]     Elevated liver enzymes     Calculus of gallbladder     Chronic anemia     Elevated BUN      Electronically signed by Madison Juarez MD on 7/27/2018 at 8:03 AM

## 2018-07-27 NOTE — CARE COORDINATION
AGUILA received final DC orders for this patient to go to [facility] on this date. AGUILA was informed that they had pulled the azar and he still needed to void prior to DC. AGUILA set up transportation for 4:00pm and informed the nurse that it could be cancelled or moved back if things did not go as planned. AGUILA learned at about 2:00pm that the patient had voided and could leave as planned. AGUILA completed and submitted the 7000 form via [employer]. Aguila provided the number for report to this patient's nurse. Peña Avitia [phone]).

## 2018-08-04 PROBLEM — I50.9 ACUTE ON CHRONIC CONGESTIVE HEART FAILURE (HCC): Status: ACTIVE | Noted: 2018-01-01

## 2018-08-04 PROBLEM — N18.9 CKD (CHRONIC KIDNEY DISEASE): Chronic | Status: ACTIVE | Noted: 2018-01-01

## 2018-08-04 NOTE — ED NOTES
Bed: 07A  Expected date:   Expected time:   Means of arrival: [facility]  Comments: Pt is brought to this ER from [facility] ECF with c/o generalized fatigue and SOB. Pt arrives A+O x 4, GCS = 15, PMS x 4 intact, eupneic, and PWD. Pt speaks in complete sentences without difficulty. Pulse regular et paced. Pt is having appropriate conversation with this nurse. Slight bilat lower leg edema noted with red, scales; moderate edema noted to pt's posterior legs and back.      Umu Hayes RN  08/04/18 3889 [facility],[address] Floor, [place] [facility], RN  08/04/18 0311

## 2018-08-05 PROBLEM — I20.0 UNSTABLE ANGINA (HCC): Status: ACTIVE | Noted: 2018-01-01

## 2018-08-05 NOTE — PROGRESS NOTES
RN entered room to inform this patient that he had just had a dysrhythmia, that the cardiologist was contacted, and that we will be checking his labs. Pt opened eyes and made eye contact with RN then shut eyes again. Since RN did not get a response to the plan, writer asked, Consuelo ? \" and pt did not respond. Writer then repeated patient's name 5 times as well as asking what was wrong and the patient did not respond. Breathing is steady and unlabored, eyes remain closed, pt slightly diaphoretic on his face/head. RN asked if he was OK, the patient slowly shook his head no. RN asked if he just didn't feel good, the patient slowly nodded. At this time writer repeated that we will be checking labs to try and figure out what's going on. Pt nodded his head. Vitals to be taken. Will continue to monitor closely.  Electronically signed by Rose Matias RN on 8/5/2018 at 6:38 PM

## 2018-08-05 NOTE — PLAN OF CARE
Problem: Falls - Risk of:  Goal: Will remain free from falls  Will remain free from falls   Outcome: Ongoing  The patient remained free from falls this shift, call light within reach, bed in locked and lowest position. Side rails up x2. Continue to monitor closely. Problem: Risk for Impaired Skin Integrity  Goal: Tissue integrity - skin and mucous membranes  Structural intactness and normal physiological function of skin and  mucous membranes. Outcome: Ongoing  Skin assessment complete. Pt turned and repositioned per self. Area kept free from moisture. Proper nourishment and fluids encouraged, as appropriate. Skin remains clean, dry, and intact. Will continue to monitor for additional needs and changes in skin breakdown. Problem: Breathing Pattern - Ineffective:  Goal: Ability to achieve and maintain a regular respiratory rate will improve  Ability to achieve and maintain a regular respiratory rate will improve   Outcome: Ongoing  Patients respiratory status stable at this time. No signs or symptoms of distress noted. Respirations non-labored and regular. Nasal canula 02 in place as needed to maintain sp02>90% or per md order.

## 2018-08-05 NOTE — PROGRESS NOTES
PROTIME  11.4   INR  1.1     APTT:   Recent Labs      18   1207  18   2329  18   0829   APTT  40.4*  >120.0*  >120.0*     MAG:   Recent Labs      18   1207   MG  1.9     D Dimer: No results for input(s): DDIMER in the last 72 hours. Troponin  No results for input(s): TROPONINI in the last 72 hours. Recent Labs      18   1207  18   1732   TROPONINT  <0.03  <0.03      BNP No results for input(s): BNP in the last 72 hours. Recent Labs      18   1207   PROBNP  4,033*         Pulse Ox: SpO2  Av %  Min: 97 %  Max: 99 %  Supplemental O2: O2 Flow Rate (L/min): 2 L/min     Current Meds:    heparin (porcine)  5,000 Units Subcutaneous 3 times per day    amLODIPine  5 mg Oral Daily    atenolol  50 mg Oral Daily    atorvastatin  80 mg Oral Daily    baclofen  10 mg Oral TID    vitamin D  2,000 Units Oral Daily    clopidogrel  75 mg Oral Daily    cyanocobalamin  1,000 mcg Oral Daily    furosemide  40 mg Oral Daily    hydrALAZINE  100 mg Oral BID    [START ON 2018] interferon beta-1a  44 mcg Subcutaneous Once per day on     cetirizine  5 mg Oral Daily    miconazole   Topical BID    tamsulosin  0.4 mg Oral Daily    latanoprost  1 drop Both Eyes Nightly    fluticasone  2 spray Each Nare BID     Continuous Infusions:    telemetry reviewed         ASSESSMENT      patient admitted with shortness of breath. He denies any chest pain to me. troponins are negative. V/Q scan negative. Mildly elevated BNP    PLAN     Acute on chronic heart failure with preserved EF  55 60%, moderate tricuspid and mitral regurgitation continue Lasix 40 mg p.o. once a day    history  Of CABG    status post ICD. Hypertension      at this point is no evidence of cardiovascular decompensation.     I agree with incentive spirometry     okay to discharge from a cardiac point of view      Electronically signed by Corinne Stanford, MD on 2018 at 12:32 PM

## 2018-08-05 NOTE — FLOWSHEET NOTE
08/05/18 1820   Provider Notification   Reason for Communication Review case   Provider Name Dr. Rodriguez   Provider Notification Physician   Method of Communication Call   Response See orders   Notification Time 1820     Dr. Mirta Darnell with cardiology was informed of pt having a 7 beat run of v-tach with intermittent pacer spikes. Dr. Rodriguez ordered for RN to enter a magnesium lab to be drawn and wants to be informed of result only if the value is abnormal. Electronically signed by Barrett Lockwood RN on 8/5/2018 at 6:22 PM    ADDED: The strip has been posted in the paper chart in room.

## 2018-08-05 NOTE — CARE COORDINATION
CASE MANAGEMENT NOTE:    Admission Date:  8/4/2018 Teresa Aden Friday is a 76 y.o.  male    Admitted for : Acute on chronic congestive heart failure, unspecified congestive heart failure type [I50.9]    Met with:  Patient and Family (Spoke with daughter Aliza Juarez on the phone)    PCP:  Dr. Felipe Thurston:  Eric Pal:  Patient is normally from [facility], however, he discharged 7/27 from our facility and he went to [facility] with skilled services. I just spoke with Carmen Humphries at [facility] and she informed me that the plan is for the patient to come back to their facility under skilled services             Current Services PTA:  No    Is patient agreeable to VNS: No    Freedom of choice provided: Yes         VNS chosen:  No    DME:  walker and shower chair    Home Oxygen: No    Nebulizer: No    Supplier: N/A    Potential Assistance Needed: No    SNF needed: Yes - [facility]    Pharmacy:  [address] [place] [address] in [place]       Does Patient want to use MEDS to BEDS? No    Family Members/Caregivers that pt would like involved in their care:    Yes    If yes, list name here:  Edilson Juarez    Transportation Provider:  Family                      Discharge Plan:  8/5: [facility] [facility] - Patient is normally from [facility] independent living but came to us from [facility] and the plan is to return to [facility] skilled. LSW to follow. Awaiting PT/OT recs. Daughter will bring in patients Rebif on Monday along with injection log (medication needs to be kept cold). Heparin gtt. Will continue to follow.  YVETTE NEEDS SIGNED/COMPLETED. Ilana Thompson                 Electronically signed by: Gloria Murcia RN on 8/5/2018 at 10:26 AM

## 2018-08-05 NOTE — PROGRESS NOTES
Results for Jodelle Homans (MRN [id number]) as of 8/5/2018 09:40   Ref. Range 8/5/2018 08:29   PTT Latest Ref Range: 23.0 - 31.0 sec >120.0 (HH)     Heparin gtt stopped. Will resume in 60 minutes and decrease rate by 4 units/kg/hr per the scale.  Electronically signed by Orville Bartholomew RN on 8/5/2018 at 9:41 AM

## 2018-08-05 NOTE — H&P
daily.  furosemide (LASIX) 40 MG tablet, Take 1 tablet by mouth daily  tamsulosin (FLOMAX) 0.4 MG capsule, Take 1 capsule by mouth daily  Azelastine-Fluticasone (DYMISTA) 137-50 MCG/ACT SUSP, 2 actuation by Nasal route 2 times daily (Patient taking differently: 1 spray by Each Nare route 2 times daily )  Travoprost, BAK Free, (TRAVATAN Z) 0.004 % SOLN ophthalmic solution, Place 1 drop into both eyes daily  bisacodyl (DULCOLAX) 10 MG suppository, Place 10 mg rectally daily as needed for Constipation  loperamide (IMODIUM) 2 MG capsule, 2 tablet by mouth after first bout of diarrhea, then 1 tablet PO after each loose stool  loratadine (CLARITIN) 10 MG capsule, Take 10 mg by mouth daily  amLODIPine (NORVASC) 5 MG tablet, Take 1 tablet by mouth daily  aspirin 81 MG EC tablet, Take 81 mg by mouth  clopidogrel (PLAVIX) 75 MG tablet, Take 75 mg by mouth  Cholecalciferol 2000 UNITS TABS, Take 1 tablet by mouth daily  cyanocobalamin 1000 MCG tablet, Take 1 tablet by mouth daily  atenolol (TENORMIN) 50 MG tablet, Take 50 mg by mouth daily  atorvastatin (LIPITOR) 80 MG tablet, Take 80 mg by mouth daily  baclofen (LIORESAL) 10 MG tablet, Take 10 mg by mouth 3 times daily  hydrALAZINE (APRESOLINE) 100 MG tablet, Take 100 mg by mouth 2 times daily  interferon beta-1a (REBIF) 44 MCG/0.5ML injection, Inject 44 mcg into the skin three times a week   sertraline (ZOLOFT) 25 MG tablet, Take 1 tablet by mouth daily for 7 days X 1 week. Then increase dose to 50 mg - 1 tablet daily. Allergies:  Alphagan [brimonidine]    Immunizations:   Immunization History   Administered Date(s) Administered    Influenza Virus Vaccine 10/19/2015    Influenza, High Dose 09/02/2016, 09/25/2017    Pneumococcal 13-valent Conjugate ([id number]) 09/02/2016    Pneumococcal Polysaccharide ([id number]) 08/14/2015    Tdap (Boostrix, Adacel) 02/19/2012       Social History:   Lives alone in independent living at [facility].  Does have some home

## 2018-08-06 NOTE — PROGRESS NOTES
Spoke with Sharyle Ruiz in the lab who confirms that the magnesium was added to the troponin order and has resulted. Mag level is 1.9; cardiology only wanted to be notified if value was abnormal. Will continue to follow.

## 2018-08-06 NOTE — PROGRESS NOTES
balance EOB today and declined to stand     Bed mobility  Rolling to Right: Moderate assistance  Supine to Sit: Moderate assistance  Sit to Supine: Maximum assistance  Scooting: Maximal assistance            Assessment  Performance deficits / Impairments: Decreased functional mobility , Decreased ADL status, Decreased ROM, Decreased strength, Decreased endurance, Decreased balance  Prognosis: Good  Decision Making: Low Complexity  History: CHF, history CVA R weak, ,MS  Exam: 6 performance deficits  Assistance / Modification: low  Patient Education: OT POC, activity promotion  REQUIRES OT FOLLOW UP: Yes  Discharge Recommendations: Subacute/Skilled Nursing Facility  Activity Tolerance: Patient limited by fatigue                   Goals  Patient Goals   Patient goals : ambulate in room with RW indep as prior  Short term goals  Time Frame for Short term goals: 1 week  Short term goal 1: denisse 15 min seated EOB with CGA  Short term goal 2: set up UE bathe and dress  Short term goal 3: denisse 2-3 min stand with mod x 2 and BUE support  Short term goal 4: denisse 10 reps x 2 AROM B shlds with min resistance    Plan        Plan  Times per week: 4-5  Times per day: Daily  Current Treatment Recommendations: Strengthening, ROM, Balance Training, Functional Mobility Training, Endurance Training, Self-Care / ADL, Patient/Caregiver Education & Training    OT Equipment Recommendations  Equipment Needed: No  OT Individual Minutes  Time In: 7404  Time Out: 1026  Minutes: 19    Electronically signed by Jessie Sanchez OT on 8/6/18 at 2:34 PM

## 2018-08-06 NOTE — PROGRESS NOTES
Progress Note    8/6/2018 7:39 AM  Subjective: Interval History: Patient states he still feels weak and SOB. Denies any chest pains  He states he doesn't see how he will get better. Diet: DIET CARDIAC;    Medications:   Reviewed medications    Labs:   CBC:   Recent Labs      08/06/18   0521   WBC  6.9   HGB  10.5*   PLT  185     BMP:    Recent Labs      08/06/18   0521   NA  147*   K  4.2   CL  107   CO2  27   BUN  61*   CREATININE  1.67*   GLUCOSE  148*     INR:   Recent Labs      08/04/18   1207   INR  1.1       Objective:   Vitals: BP (!) 150/69   Pulse 63   Temp 96.8 °F (36 °C) (Oral)   Resp 16   Ht 5' 9\" (1.753 m)   Wt 174 lb (78.9 kg)   SpO2 100%   BMI 25.70 kg/m²   General appearance: alert and cooperative with exam  Neck: no adenopathy and thyroid not enlarged, symmetric, no tenderness/mass/nodules  Lungs: clear to auscultation bilaterally  Heart: regular rate and rhythm, S1, S2 normal, no murmur, click, rub or gallop  Abdomen: soft, non-tender; bowel sounds normal; no masses,  no organomegaly  Extremities: weakness cody right  Neurologic: Mental status: Alert, oriented, thought content appropriate, depressed. Assessment and Plan:    Unstable angina (HCC)    P: cardiology note appreciated. Nephrology to see and repeat cxr. If there are not changes from nephrology and cxr stable will d/c back to Rehab/skilled.      Patient Active Problem List:     Abnormality of gait     Essential hypertension     Benign prostatic hyperplasia     Carotid atherosclerosis     Cataract     Coronary arteriosclerosis     Diabetes mellitus ([facility] [address]     Glaucoma     HTN (hypertension)     Hyperlipidemia     Male erectile disorder     Multiple sclerosis (HCC)     Vitamin B12 deficiency     Vitamin D deficiency     Dementia associated with other underlying disease     Anxiety     Localized edema     Environmental allergies     Congestive heart failure (HCC)     DANIEL (acute kidney injury) ([facility] [address]     Elevated liver enzymes Calculus of gallbladder     Chronic anemia     Elevated BUN     Acute on chronic congestive heart failure (HCC)     CKD (chronic kidney disease)     Unstable angina [facility])      Electronically signed by Judson Yarbrough MD on 8/6/2018 at 7:39 AM

## 2018-08-06 NOTE — PROGRESS NOTES
[address] [id number] Continence Nurse  Consult Note       NAME:  Keith Brooks Friday  MEDICAL RECORD NUMBER:  [id number]  AGE: 76 y.o. GENDER: male  : 1950  TODAY'S DATE:  2018    Subjective   Reason for WOCN Evaluation and Assessment: wounds to right dorsal foot, right anterior and posterior ankle      Keith Brooks Friday is a 76 y.o. male referred by:   [] Physician  [] Nursing  [] Other:     Wound Identification:  Wound Type: undetermined  Contributing Factors: diabetes, chronic pressure, decreased mobility, arterial insufficiency and decreased tissue oxygenation    Wound History: patient states wounds present for about 2 weeks. He states he is uncertain how they developed.  He denies use of a brace or boot to that leg/ foot  Current Wound Care Treatment:  Open to air    Patient Goal of Care:  [x] Wound Healing  [] Odor Control  [] Palliative Care  [] Pain Control   [] Other:         PAST MEDICAL HISTORY        Diagnosis Date    Abnormal radiologic findings on diagnostic imaging of renal pelvis, ureter, or bladder     Atherosclerotic heart disease of native coronary artery without angina pectoris     Cardiac defibrillator in place     Cardiac pacemaker     Cognitive communication deficit     Combined systolic and diastolic heart failure (HCC)     congestive    Essential hypertension     Glaucoma     unspecified    Hemiplegia and hemiparesis following cerebral infarction affecting right dominant side ([address]     Hx of CABG     CABG x 5    Hyperlipidemia     Major depressive disorder     MS (multiple sclerosis) (HCC)     Muscle weakness     Other symbolic dysfunctions (CODE)     Shortness of breath     Type 2 diabetes mellitus ([address]     Unspecified abnormalities of gait and mobility        PAST SURGICAL HISTORY    Past Surgical History:   Procedure Laterality Date    BYPASS GRAFT      using vein    CARDIAC DEFIBRILLATOR PLACEMENT      Pacer/Defib    CORONARY ANGIOPLASTY WITH STENT PLACEMENT  CORONARY ARTERY BYPASS GRAFT      CABG x 5    PACEMAKER PLACEMENT         FAMILY HISTORY    Family History   Problem Relation Age of Onset    Alzheimer's Disease Mother     Cancer Father         throat       SOCIAL HISTORY    Social History   Substance Use Topics    Smoking status: Never Smoker    Smokeless tobacco: Never Used    Alcohol use No       ALLERGIES    Allergies   Allergen Reactions    Alphagan [Brimonidine]      Other reaction(s): Intolerance-unknown       MEDICATIONS    No current facility-administered medications on file prior to encounter. Current Outpatient Prescriptions on File Prior to Encounter   Medication Sig Dispense Refill    acetaminophen (TYLENOL) 325 MG tablet Take 2 tablets by mouth every 4 hours as needed for Pain 120 tablet 3    miconazole (MICOTIN) 2 % powder Apply topically 2 times daily.  45 g 1    furosemide (LASIX) 40 MG tablet Take 1 tablet by mouth daily 60 tablet 3    tamsulosin (FLOMAX) 0.4 MG capsule Take 1 capsule by mouth daily 30 capsule 3    Azelastine-Fluticasone (DYMISTA) 137-50 MCG/ACT SUSP 2 actuation by Nasal route 2 times daily (Patient taking differently: 1 spray by Each Nare route 2 times daily ) 1 Bottle 2    Travoprost, BAK Free, (TRAVATAN Z) 0.004 % SOLN ophthalmic solution Place 1 drop into both eyes daily 1 Bottle 3    bisacodyl (DULCOLAX) 10 MG suppository Place 10 mg rectally daily as needed for Constipation      loperamide (IMODIUM) 2 MG capsule 2 tablet by mouth after first bout of diarrhea, then 1 tablet PO after each loose stool 60 capsule 2    loratadine (CLARITIN) 10 MG capsule Take 10 mg by mouth daily      amLODIPine (NORVASC) 5 MG tablet Take 1 tablet by mouth daily 30 tablet 5    aspirin 81 MG EC tablet Take 81 mg by mouth      clopidogrel (PLAVIX) 75 MG tablet Take 75 mg by mouth      Cholecalciferol 2000 UNITS TABS Take 1 tablet by mouth daily 30 tablet 3    cyanocobalamin 1000 MCG tablet Take 1 tablet by mouth daily 30 tablet 11    atenolol (TENORMIN) 50 MG tablet Take 50 mg by mouth daily      atorvastatin (LIPITOR) 80 MG tablet Take 80 mg by mouth daily      baclofen (LIORESAL) 10 MG tablet Take 10 mg by mouth 3 times daily      hydrALAZINE (APRESOLINE) 100 MG tablet Take 100 mg by mouth 2 times daily      interferon beta-1a (REBIF) 44 MCG/0.5ML injection Inject 44 mcg into the skin three times a week       sertraline (ZOLOFT) 25 MG tablet Take 1 tablet by mouth daily for 7 days X 1 week. Then increase dose to 50 mg - 1 tablet daily.  7 tablet 0       Objective    BP (!) 152/54   Pulse 63   Temp 97.4 °F (36.3 °C) (Oral)   Resp 16   Ht 5' 9\" (1.753 m)   Wt 174 lb (78.9 kg)   SpO2 99%   BMI 25.70 kg/m²     LABS:  WBC:    Lab Results   Component Value Date    WBC 6.9 08/06/2018     H/H:    Lab Results   Component Value Date    HGB 10.5 08/06/2018    HCT 32.9 08/06/2018     PTT:    Lab Results   Component Value Date    APTT 51.8 08/05/2018    APTT 27.4 07/05/2013   [APTT}  PT/INR:    Lab Results   Component Value Date    PROTIME 11.4 08/04/2018    INR 1.1 08/04/2018     HgBA1c:    Lab Results   Component Value Date    LABA1C 6.3 07/23/2018       Assessment   Ramon Risk Score: Ramon Scale Score: 18    Patient Active Problem List   Diagnosis Code    Abnormality of gait R26.9    Essential hypertension I10    Benign prostatic hyperplasia N40.0    Carotid atherosclerosis I65.29    Cataract H26.9    Coronary arteriosclerosis I25.10    [facility] E11.9    Glaucoma H40.9    HTN (hypertension) I10    Hyperlipidemia E78.5    Male erectile disorder N52.9    Multiple sclerosis ([facility] G35    Vitamin B12 deficiency E53.8    Vitamin D deficiency E55.9    Dementia associated with other underlying disease F02.80    Anxiety F41.9    Localized edema R60.0    Environmental allergies Z91.09    Congestive heart failure (HCC) I50.9    DANIEL (acute kidney injury) ([facility] N17.9    Elevated liver enzymes R74.8 PM   Wound Length (cm) 3 cm 8/6/2018 12:33 PM   Wound Width (cm) 1.5 cm 8/6/2018 12:33 PM   Calculated Wound Size (cm^2) (l*w) 4.5 cm^2 8/6/2018 12:33 PM   Wound Assessment Dry 8/6/2018 12:33 PM   Drainage Amount None 8/6/2018 12:33 PM   Odor None 8/6/2018 12:33 PM   Lili-wound Assessment Intact 8/6/2018 12:33 PM   Number of days: 0       Response to treatment:  Well tolerated by patient. Pain Assessment:  Severity:  0 / 10    Plan of Care: Wound 08/06/18 Abrasion(s) Ankle Anterior;Right-Dressing/Treatment: Antibacterial Ointment, Non adherent  Wound 08/06/18 Foot Dorsal;Right-Dressing/Treatment: Antibacterial Ointment, Non adherent, Dry dressing  Wound 08/06/18 Ankle Posterior;Right-Dressing/Treatment: Antibacterial Ointment, Non adherent, ABD  Wound 07/23/18 Other (Comment) Leg Right weeping edema with blisters; two broken open-Dressing/Treatment: Open to air  Wound 07/23/18 Skin tear Elbow Left red abrasion with open center-Dressing/Treatment: Foam    Specialty Bed Required : Yes   [] Low Air Loss   [x] Pressure Redistribution  [] Fluid Immersion  [] Bariatric  [] Total Pressure Relief  [] Other:     Current Diet: DIET CARDIAC;   Dietician consult:  No    Discharge Plan:  Placement for patient upon discharge: skilled nursing    Patient appropriate for Outpatient [address]: No    Referrals:  []   [] [facility]  [] Supplies  [] Other    Patient/Caregiver Teaching:  Level of patient/caregiver understanding able to:   [] Indicates understanding       [] Needs reinforcement  [] Unsuccessful      [x] Verbal Understanding  [] Demonstrated understanding       [] No evidence of learning  [] Refused teaching         [] N/A       Electronically signed by Cato Gosselin, APRN - CNP, CWOCN on 8/6/2018 at 1:34 PM

## 2018-08-06 NOTE — FLOWSHEET NOTE
08/06/18 1404   Encounter Summary   Services provided to: Patient   Referral/Consult From: [facility] Family members   Continue Visiting (8/6/2018)   Complexity of Encounter Moderate   Length of Encounter 15 minutes   Routine   Type Initial   Assessment Approachable   Intervention Active listening;Prayer   Outcome Expressed gratitude   PT  Seemed a little passive today but was glad for my visit, he said things are going well and everyone has been good to him,he accepted my offer for prayer,he is [employer]. Chaplains remain available for spiritual or emotional support as needed.

## 2018-08-07 PROBLEM — N17.9 ACUTE KIDNEY FAILURE (HCC): Status: ACTIVE | Noted: 2018-01-01

## 2018-08-07 NOTE — CONSULTS
Consults       Department of Internal Medicine  Nephrology Jennifre Mac MD   Consult Note      SUBJECTIVE: This is a 76 y.o. male with a significant past medical history of Type 2 diabetes mellitus, BPH [on Flomax], coronary artery disease status post CABG, diastolic heart failure, Multiple sclerosis [bedbound] and chronic kidney disease stage 3 [baseline serum creatinine 1.3 to 1.4 mg/dL], was recently admitted to [facility] for shortness of breath and hyperkalemia and seen by nephrology on 7/23/2018, who presented to the hospital on 8/4/2018 with complaints of acute onset of shortness of breath and increasing fatigue from the ECF. He had been discharged on 7/27/2018 to [facility] with a serum creatinine of 2.03 mg/dL and on presentation, his BUN/creatinine were 55/1.80 mg/dL. He feels slightly better today and is nonoliguric. Interval history:  Patient remains stable and shortness of breath is slowly improving-Remains on Lasix 40 mg daily p.o. and hydrochlorothiazide 25 mg daily was added. He is hyponatremic with serum sodium of 149.   Alphagan [brimonidine]    Past Medical History:   Diagnosis Date    Abnormal radiologic findings on diagnostic imaging of renal pelvis, ureter, or bladder     Atherosclerotic heart disease of native coronary artery without angina pectoris     Cardiac defibrillator in place     Cardiac pacemaker     Cognitive communication deficit     Combined systolic and diastolic heart failure (HCC)     congestive    Essential hypertension     Glaucoma     unspecified    Hemiplegia and hemiparesis following cerebral infarction affecting right dominant side ([facility] [address]     Hx of CABG     CABG x 5    Hyperlipidemia     Major depressive disorder     MS (multiple sclerosis) (HCC)     Muscle weakness     Other symbolic dysfunctions (CODE)     Shortness of breath     Type 2 diabetes mellitus (HCC)     Unspecified abnormalities of gait and mobility        Scheduled Meds:   24 hour   Intake              240 ml   Output                0 ml   Net              240 ml       Constitutional: alert, appears stated age and cooperative    Skin: Skin color, texture, turgor normal. No rashes or lesions    Head: Normocephalic, without obvious abnormality, atraumatic     Cardiovascular/Edema: Z1,D5 with 2/6 systolic murmur. Respiratory: Lungs: diminished breath sounds bibasilar    Abdomen: soft, non-tender; bowel sounds normal; no masses,  no organomegaly    Back: symmetric, no curvature. ROM normal. No CVA tenderness. Extremities: edema +    Neuro:  Bilateral lower extremity weakness. CBC:   Recent Labs      08/05/18 0829 08/06/18 0521 08/07/18 0518   WBC  8.2  6.9  6.3   HGB  10.0*  10.1*  10.5*  10.3*   PLT  184  185  172     BMP:    Recent Labs      08/05/18 0829 08/06/18 0521 08/07/18 0518   NA  144  147*  149*   K  4.3  4.2  4.1   CL  104  107  108*   CO2  27  27  29   BUN  61*  61*  59*   CREATININE  1.92*  1.67*  1.65*   GLUCOSE  204*  148*  122*       Lab Results   Component Value Date    NITRU POSITIVE 08/04/2018    COLORU YELLOW 08/04/2018    PHUR 6.0 08/04/2018    WBCUA 5 TO 10 08/04/2018    RBCUA 0 TO 2 08/04/2018    MUCUS NOT REPORTED 08/04/2018    TRICHOMONAS NOT REPORTED 08/04/2018    YEAST NOT REPORTED 08/04/2018    BACTERIA FEW 08/04/2018    SPECGRAV 1.014 08/04/2018    LEUKOCYTESUR NEGATIVE 08/04/2018    UROBILINOGEN Normal 08/04/2018    BILIRUBINUR NEGATIVE 08/04/2018    GLUCOSEU NEGATIVE 08/04/2018    KETUA NEGATIVE 08/04/2018    AMORPHOUS NOT REPORTED 08/04/2018     2D echocardiogram performed 7/23/2018 showed: normal left ventricular size and wall thickness with global left ventricular systolic function being normal and LVEF 55-60%. Moderate mitral and tricuspid regurgitation. Renal ultrasound performed 7/24/2018 showed: No hydronephrosis.  Left kidney is smaller with cortical thinning. IMPRESSION/RECOMMENDATIONS:      1.   Acute kidney injury superimposed on chronic kidney disease stage 3 - consistent with prerenal azotemia. Chest x-ray performed at presentation showed cardiomegaly as well as mild pulmonary edema. Plan: Continue furosemide 40 mg p.o. Daily. Renal diet. 2.  Hypernatremia with hypervolemia with decreased free water. Cont hydrochlorothiazide 25 mg p.o. Daily. D5 water at 75 cc/h repeat serum sodium IN 4 HRS    3. Systemic hypertension - Stable      Patient is planned for transfer to [facility]. He does not have a thirst sensation despite his serum sodium of 149. He is at risk for hypernatremia due to decreased free water intake. Would advise that he has scheduled water intake of 1 L per day in a  Pitcher. Follow BMP weekly at the [facility].   F/u CKD office in 4 weeks  MD PATRICE David  Attending Nephrologist  8/7/2018 6:03 PM

## 2018-08-07 NOTE — PROGRESS NOTES
ROM;Decreased strength;Decreased endurance;Decreased balance  Prognosis: Good  Discharge Recommendations: Subacute/Skilled Nursing Facility  Activity Tolerance: Patient limited by fatigue                Patient Education:  Patient Education: OT POC, activity promotion  Learner:patient  Method: demonstration and explanation       Outcome: demonstrated understanding     Plan  Plan  Times per week: 4-5  Times per day: Daily  Current Treatment Recommendations: Strengthening, ROM, Balance Training, Functional Mobility Training, Endurance Training, Self-Care / ADL, Patient/Caregiver Education & Training      Goals  Short term goals  Time Frame for Short term goals: 1 week  Short term goal 1: denisse 15 min seated EOB with CGA  Short term goal 2: set up UE bathe and dress  Short term goal 3: denisse 2-3 min stand with mod x 2 and BUE support  Short term goal 4: denisse 10 reps x 2 AROM B shlds with min resistance    OT Individual Minutes  Time In: 0313  Time Out: 1500  Minutes: 57      Electronically signed by GI Zepeda on 8/7/18 at 4:03 PM

## 2018-08-07 NOTE — PROGRESS NOTES
Progress Note    8/7/2018 8:51 AM  Subjective: Interval History: Patient states he still feels congested in chest, SOB  Is constipated. He is willing to go to rehab. Diet: DIET CARDIAC;    Medications:   Reviewed medications    Labs:   CBC:   Recent Labs      08/07/18   0518   WBC  6.3   HGB  10.3*   PLT  172     BMP:    Recent Labs      08/07/18   0518   NA  149*   K  4.1   CL  108*   CO2  29   BUN  59*   CREATININE  1.65*   GLUCOSE  122*     INR:   Recent Labs      08/04/18   1207   INR  1.1       Objective:   Vitals: BP (!) 144/60   Pulse 67   Temp 97.9 °F (36.6 °C)   Resp 18   Ht 5' 9\" (1.753 m)   Wt 174 lb (78.9 kg)   SpO2 100%   BMI 25.70 kg/m²   General appearance: alert and cooperative with exam  Neck: no adenopathy and thyroid not enlarged, symmetric, no tenderness/mass/nodules  Lungs: clear to auscultation bilaterally and diminished breath sounds base - left  Heart: regular rate and rhythm, S1, S2 normal, no murmur, click, rub or gallop  Abdomen: soft, non-tender; bowel sounds normal; no masses,  no organomegaly  Extremities: weakness cody on right side  Neurologic: Mental status: Alert, oriented, thought content appropriate    Assessment and Plan:    Unstable angina (HCC)   CHF    P: diuretics to be adjusted by nephrology       Wound care       Suppository for constipation.      Patient Active Problem List:     Abnormality of gait     Essential hypertension     Benign prostatic hyperplasia     Carotid atherosclerosis     Cataract     Coronary arteriosclerosis     Diabetes mellitus ([facility] [address]     Glaucoma     HTN (hypertension)     Hyperlipidemia     Male erectile disorder     Multiple sclerosis (HCC)     Vitamin B12 deficiency     Vitamin D deficiency     Dementia associated with other underlying disease     Anxiety     Localized edema     Environmental allergies     Congestive heart failure (HCC)     DANIEL (acute kidney injury) ([facility] [address]     Elevated liver enzymes     Calculus of gallbladder     Chronic

## 2018-08-07 NOTE — PLAN OF CARE
Problem: Falls - Risk of:  Goal: Will remain free from falls  Will remain free from falls   Outcome: Met This Shift  Patient is alert and oriented, calls out appropriately, bed is in lowest position with wheels locked and bed alarm on. Call light is within reach. Problem: Risk for Impaired Skin Integrity  Goal: Tissue integrity - skin and mucous membranes  Structural intactness and normal physiological function of skin and  mucous membranes. Outcome: Ongoing  Patient has dressings on reddened areas. Will continue to monitor. Problem: Breathing Pattern - Ineffective:  Goal: Ability to achieve and maintain a regular respiratory rate will improve  Ability to achieve and maintain a regular respiratory rate will improve   Outcome: Met This Shift  Patient's respiratory rate has remained within normal limits this shift.

## 2018-08-21 NOTE — DISCHARGE SUMMARY
[facility]   Discharge Summary      Patient ID: Juan Castillo Friday    MRN: [id number]     Acct:  [de-identified]       Patient's PCP: Robbert Mohs, MD    Admit Date: 8/4/2018     Discharge Date: 8/7/2018      Admitting Physician: Maikel Sanchez MD    Discharge Physician: Maikel Sanchez MD     Discharge Diagnoses:    Primary Problem  Unstable angina [facility])  Unstable angina [facility])    Active Hospital Problems    Diagnosis Date Noted    Acute kidney failure ([address] [N17.9] 08/07/2018    Unstable angina ([address] [I20.0] 08/05/2018    Acute on chronic congestive heart failure ([address] [I50.9] 08/04/2018    CKD (chronic kidney disease) [N18.9] 08/04/2018    Essential hypertension [I10] 01/29/2016    Coronary arteriosclerosis [I25.10] 01/29/2016    Diabetes mellitus ([address] [E11.9] 01/29/2016    Multiple sclerosis ([facility] [address] 01/29/2016     Past Medical History:   Diagnosis Date    Abnormal radiologic findings on diagnostic imaging of renal pelvis, ureter, or bladder     Atherosclerotic heart disease of native coronary artery without angina pectoris     Cardiac defibrillator in place     Cardiac pacemaker     Cognitive communication deficit     Combined systolic and diastolic heart failure (HCC)     congestive    Essential hypertension     Glaucoma     unspecified    Hemiplegia and hemiparesis following cerebral infarction affecting right dominant side ([address]     Hx of CABG     CABG x 5    Hyperlipidemia     Major depressive disorder     MS (multiple sclerosis) ([address]     Muscle weakness     Other symbolic dysfunctions (CODE)     Shortness of breath     Type 2 diabetes mellitus ([address]     Unspecified abnormalities of gait and mobility      The patient was seen and examined on day of discharge and this discharge summary is in conjunction with any daily progress note from day of discharge. Code Status:  Prior    Hospital Course: SOB and chest pain, weakness.      Consults:  cardiology and nephrology    Significant Diagnostic Studies: as above, and as follows: see ehr. Electrolyte imbalance, renal failure, anemia.  Cxr: pulmonary edema  V/Q scan lung: low probability PE    Treatments: as above    Disposition: SNF    Discharged Condition: Stable    Follow Up:  Luis Enrique Anderson MD in two weeks    Discharge Medications:    Friday, Ulysses Coughlin   Home Medication Instructions [id number]    Printed on:08/21/18 1311   Medication Information                      acetaminophen (TYLENOL) 325 MG tablet  Take 2 tablets by mouth every 4 hours as needed for Pain             amLODIPine (NORVASC) 5 MG tablet  Take 1 tablet by mouth daily             aspirin 81 MG EC tablet  Take 81 mg by mouth             atenolol (TENORMIN) 50 MG tablet  Take 50 mg by mouth daily             atorvastatin (LIPITOR) 80 MG tablet  Take 80 mg by mouth daily             Azelastine-Fluticasone (DYMISTA) 137-50 MCG/ACT SUSP  2 actuation by Nasal route 2 times daily             baclofen (LIORESAL) 10 MG tablet  Take 10 mg by mouth 3 times daily             bisacodyl (DULCOLAX) 10 MG suppository  Place 10 mg rectally daily as needed for Constipation             Cholecalciferol 2000 UNITS TABS  Take 1 tablet by mouth daily             clopidogrel (PLAVIX) 75 MG tablet  Take 75 mg by mouth             cyanocobalamin 1000 MCG tablet  Take 1 tablet by mouth daily             furosemide (LASIX) 40 MG tablet  Take 1 tablet by mouth daily             hydrALAZINE (APRESOLINE) 100 MG tablet  Take 100 mg by mouth 2 times daily             hydrochlorothiazide (HYDRODIURIL) 25 MG tablet  Take 1 tablet by mouth daily             interferon beta-1a (REBIF) 44 MCG/0.5ML injection  Inject 44 mcg into the skin three times a week              loperamide (IMODIUM) 2 MG capsule  2 tablet by mouth after first bout of diarrhea, then 1 tablet PO after each loose stool             loratadine (CLARITIN) 10 MG capsule  Take 10 mg by mouth daily             miconazole (MICOTIN) 2 % powder  Apply topically 2 times daily. sertraline (ZOLOFT) 25 MG tablet  Take 1 tablet by mouth daily for 7 days X 1 week. Then increase dose to 50 mg - 1 tablet daily. tamsulosin (FLOMAX) 0.4 MG capsule  Take 1 capsule by mouth daily             Travoprost, KAY Free, (TRAVATAN Z) 0.004 % SOLN ophthalmic solution  Place 1 drop into both eyes daily                  Activity: light activity to bedrest    Diet: renal diet    Time Spent on discharge is more than 20 minutes in the examination, evaluation, counseling and review of medications and discharge plan. Electronically signed by Sneha Vazquez MD on 8/21/2018 at 1:11 PM     Thank you Dr. Dagoberto Dance, MD for the opportunity to be involved in this patient's care.

## 2018-08-27 PROBLEM — E87.0 HYPERNATREMIA: Status: ACTIVE | Noted: 2018-01-01

## 2018-08-27 PROBLEM — E87.5 HYPERKALEMIA: Status: ACTIVE | Noted: 2018-01-01

## 2018-08-27 PROBLEM — N18.30 CKD (CHRONIC KIDNEY DISEASE), STAGE III (HCC): Chronic | Status: ACTIVE | Noted: 2018-01-01

## 2023-02-10 NOTE — DISCHARGE SUMMARY
baclofen (LIORESAL) 10 MG tablet  Take 10 mg by mouth 3 times daily             bisacodyl (DULCOLAX) 10 MG suppository  Place 10 mg rectally daily as needed for Constipation             Cholecalciferol 2000 UNITS TABS  Take 1 tablet by mouth daily             clopidogrel (PLAVIX) 75 MG tablet  Take 75 mg by mouth             cyanocobalamin 1000 MCG tablet  Take 1 tablet by mouth daily             furosemide (LASIX) 40 MG tablet  Take 1 tablet by mouth daily             hydrALAZINE (APRESOLINE) 100 MG tablet  Take 100 mg by mouth 2 times daily             interferon beta-1a (REBIF) 44 MCG/0.5ML injection  Inject 44 mcg into the skin three times a week              loperamide (IMODIUM) 2 MG capsule  2 tablet by mouth after first bout of diarrhea, then 1 tablet PO after each loose stool             loratadine (CLARITIN) 10 MG capsule  Take 10 mg by mouth daily             miconazole (MICOTIN) 2 % powder  Apply topically 2 times daily. sertraline (ZOLOFT) 25 MG tablet  Take 1 tablet by mouth daily for 7 days X 1 week. Then increase dose to 50 mg - 1 tablet daily. tamsulosin (FLOMAX) 0.4 MG capsule  Take 1 capsule by mouth daily             Travoprost, BAK Free, (TRAVATAN Z) 0.004 % SOLN ophthalmic solution  Place 1 drop into both eyes daily                 Significant Diagnostic Studies:      Radiology Review: Other diagnostic test:      Disposition:   [facility]  Follow up with Vera Pantoja MD in 2 weeks.   Discharge patient to: [address]    Electronically signed by Vera Pantoja MD on 7/27/2018 at 8:09 AM no

## 2024-03-19 NOTE — CONSULTS
(LIPITOR) tablet 80 mg Daily   baclofen (LIORESAL) tablet 10 mg TID   bisacodyl (DULCOLAX) suppository 10 mg Daily PRN   vitamin D (CHOLECALCIFEROL) tablet 2,000 Units Daily   vitamin B-12 (CYANOCOBALAMIN) tablet 1,000 mcg Daily   hydrALAZINE (APRESOLINE) tablet 100 mg BID   interferon beta-1a (REBIF) injection 44 mcg Once per day on Mon Wed Fri   cetirizine (ZYRTEC) tablet 5 mg Daily   sertraline (ZOLOFT) tablet 25 mg Daily   latanoprost (XALATAN) 0.005 % ophthalmic solution 1 drop Nightly   enoxaparin (LOVENOX) injection 40 mg Daily   zolpidem (AMBIEN) tablet 5 mg Nightly PRN   losartan (COZAAR) tablet 25 mg Daily   miconazole (MICOTIN) 2 % powder BID   glucose (GLUTOSE) 40 % oral gel 15 g PRN   dextrose 50 % solution 12.5 g PRN   glucagon (rDNA) injection 1 mg PRN   dextrose 5 % solution PRN   insulin lispro (HUMALOG) injection vial 0-12 Units TID WC   insulin lispro (HUMALOG) injection vial 0-6 Units Nightly   sodium polystyrene (KAYEXALATE) 15 GM/60ML suspension 30 g Once   glipiZIDE (GLUCOTROL) tablet 10 mg Daily   fluticasone (FLONASE) 50 MCG/ACT nasal spray 2 spray BID       Allergies:  Alphagan [brimonidine]    Social History:   Social History     Social History    Marital status: Legally      Spouse name: N/A    Number of children: N/A    Years of education: N/A     Occupational History    Not on file.      Social History Main Topics    Smoking status: Never Smoker    Smokeless tobacco: Never Used    Alcohol use No    Drug use: No    Sexual activity: Not on file     Other Topics Concern    Not on file     Social History Narrative    No narrative on file       Family History:   Family History   Problem Relation Age of Onset    Alzheimer's Disease Mother     Cancer Father         throat       Review of Systems:    Constitutional: No fever, no chills, no night sweats, fatigue, generalized weakness, loss of appetite  HEENT:  No headache, otalgia, itchy eyes, epistaxis, nasal discharge or Previously Declined (within the last year)